# Patient Record
Sex: MALE | Race: WHITE | NOT HISPANIC OR LATINO | URBAN - METROPOLITAN AREA
[De-identification: names, ages, dates, MRNs, and addresses within clinical notes are randomized per-mention and may not be internally consistent; named-entity substitution may affect disease eponyms.]

---

## 2024-07-15 ENCOUNTER — OFFICE VISIT (OUTPATIENT)
Dept: PRIMARY CARE | Facility: CLINIC | Age: 24
End: 2024-07-15
Payer: COMMERCIAL

## 2024-07-15 ENCOUNTER — HOSPITAL ENCOUNTER (OUTPATIENT)
Dept: RADIOLOGY | Facility: HOSPITAL | Age: 24
Discharge: HOME | End: 2024-07-15
Payer: COMMERCIAL

## 2024-07-15 VITALS
WEIGHT: 137 LBS | SYSTOLIC BLOOD PRESSURE: 122 MMHG | BODY MASS INDEX: 20.76 KG/M2 | HEIGHT: 68 IN | DIASTOLIC BLOOD PRESSURE: 76 MMHG | TEMPERATURE: 96.7 F | OXYGEN SATURATION: 98 % | HEART RATE: 70 BPM

## 2024-07-15 DIAGNOSIS — M54.31 RIGHT SCIATIC NERVE PAIN: ICD-10-CM

## 2024-07-15 DIAGNOSIS — M54.31 RIGHT SCIATIC NERVE PAIN: Primary | ICD-10-CM

## 2024-07-15 DIAGNOSIS — F20.0 PARANOID SCHIZOPHRENIA (MULTI): ICD-10-CM

## 2024-07-15 DIAGNOSIS — J45.20 MILD INTERMITTENT ASTHMA WITHOUT COMPLICATION (HHS-HCC): ICD-10-CM

## 2024-07-15 PROCEDURE — 1036F TOBACCO NON-USER: CPT | Performed by: FAMILY MEDICINE

## 2024-07-15 PROCEDURE — 72110 X-RAY EXAM L-2 SPINE 4/>VWS: CPT | Performed by: RADIOLOGY

## 2024-07-15 PROCEDURE — 72110 X-RAY EXAM L-2 SPINE 4/>VWS: CPT

## 2024-07-15 PROCEDURE — 99204 OFFICE O/P NEW MOD 45 MIN: CPT | Performed by: FAMILY MEDICINE

## 2024-07-15 RX ORDER — IBUPROFEN 200 MG
200 TABLET ORAL EVERY 6 HOURS
COMMUNITY
End: 2024-07-15 | Stop reason: SDUPTHER

## 2024-07-15 RX ORDER — CYCLOBENZAPRINE HCL 5 MG
5 TABLET ORAL NIGHTLY PRN
Qty: 30 TABLET | Refills: 0 | Status: SHIPPED | OUTPATIENT
Start: 2024-07-15

## 2024-07-15 RX ORDER — IBUPROFEN 600 MG/1
600 TABLET ORAL EVERY 8 HOURS PRN
Qty: 90 TABLET | Refills: 0 | Status: SHIPPED | OUTPATIENT
Start: 2024-07-15

## 2024-07-15 ASSESSMENT — PATIENT HEALTH QUESTIONNAIRE - PHQ9
1. LITTLE INTEREST OR PLEASURE IN DOING THINGS: NOT AT ALL
2. FEELING DOWN, DEPRESSED OR HOPELESS: NOT AT ALL
SUM OF ALL RESPONSES TO PHQ9 QUESTIONS 1 AND 2: 0

## 2024-07-15 ASSESSMENT — LIFESTYLE VARIABLES: HOW MANY STANDARD DRINKS CONTAINING ALCOHOL DO YOU HAVE ON A TYPICAL DAY: PATIENT DOES NOT DRINK

## 2024-07-15 ASSESSMENT — PAIN SCALES - GENERAL: PAINLEVEL: 0-NO PAIN

## 2024-07-15 NOTE — PROGRESS NOTES
History Of Present Illness  Dante Crawford is a 23 y.o. male presenting for Establish Care (NP/ Est care- Concerns with vernon taylor in legs over the last 6years with at times legs just gives out patient states.)  .    HPI   New patient here to establish care.  He complains of intermittent right buttock pain that shoots down right leg. Last for a few days and then goes away on its own.  He takes ibuprofen 200 mg once every 4-6 hours without much relief.  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  He otherwise does not do exercise on its own except dance dance revolution once a week for 2 hours.  Currently he is not in any pain.  He denies any associated symptoms of saddle paresthesia, lower extremity weakness, bowel or bladder dysfunction.    History of paranoid schizophrenia. He's not currently established with Psych. Reports mood is stable. Lives with mom and stepdad.    Past Medical History  Patient Active Problem List    Diagnosis Date Noted    Paranoid schizophrenia (Multi) 07/15/2024    Allergic rhinitis due to animal (cat) (dog) hair and dander 04/11/2007    Asthma ([facility]) 04/11/2007        Medications  Current Outpatient Medications   Medication Sig Dispense Refill    cyclobenzaprine (Flexeril) 5 mg tablet Take 1 tablet (5 mg) by mouth as needed at bedtime for muscle spasms. 30 tablet 0    ibuprofen 600 mg tablet Take 1 tablet (600 mg) by mouth every 8 hours if needed for moderate pain (4 - 6). 90 tablet 0     No current facility-administered medications for this visit.        Surgical History  He has a past surgical history that includes Chest surgery.     Social History  He reports that he has never smoked. He has never used smokeless tobacco. He reports that he does not drink alcohol and does not use drugs.    Family History  Family History   Problem Relation Name Age of Onset    No Known Problems Mother      No Known Problems Sister          Allergies  Sulfa (sulfonamide antibiotics), Tree  "nuts, and Amoxicillin    Immunizations  Immunization History   Administered Date(s) Administered    Pfizer Purple Cap SARS-CoV-2 06/20/2021, 07/11/2021        ROS  Negative, except as discussed in HPI     Vitals  /76   Pulse 70   Temp 35.9 °C (96.7 °F)   Ht 1.727 m (5' 8\")   Wt 62.1 kg (137 lb)   SpO2 98%   BMI 20.83 kg/m²      Physical Exam  Vitals and nursing note reviewed.   Constitutional:       Appearance: Normal appearance.   Musculoskeletal:         General: Normal range of motion.      Cervical back: Normal. No spasms or bony tenderness.      Thoracic back: Normal. No spasms, tenderness or bony tenderness.      Lumbar back: Normal. No spasms, tenderness or bony tenderness. Negative right straight leg raise test and negative left straight leg raise test.   Neurological:      General: No focal deficit present.      Mental Status: He is alert.      Gait: Gait normal.      Deep Tendon Reflexes: Reflexes normal.         Relevant Results  Lab Results   Component Value Date    WBC 11.7 (H) 10/13/2018    WBC 14.6 (H) 10/08/2018    HGB 16.1 10/13/2018    HGB 14.3 10/08/2018    HCT 46.4 10/13/2018    HCT 42.5 10/08/2018    MCV 84.1 10/13/2018    MCV 85.7 10/08/2018     10/13/2018     10/08/2018     Lab Results   Component Value Date     10/13/2018     10/08/2018    K 4.1 10/13/2018    K 4.0 10/08/2018    CL 93 (L) 10/13/2018     10/08/2018    CO2 24 10/13/2018    CO2 23 (L) 10/08/2018    BUN 11 10/13/2018    BUN 9 10/08/2018    CREATININE 0.8 10/13/2018    CREATININE 1.0 10/08/2018    CALCIUM 9.6 10/13/2018    CALCIUM 8.5 10/08/2018    PROT 7.9 10/13/2018    PROT 6.5 10/08/2018    BILITOT 1.8 (H) 10/13/2018    BILITOT 1.5 (H) 10/08/2018    ALKPHOS 101 10/13/2018    ALKPHOS 85 10/08/2018    ALT 56 (H) 10/13/2018    ALT 21 10/08/2018    AST 68 (H) 10/13/2018    AST 68 (H) 10/08/2018    GLUCOSE 104 (H) 10/13/2018    GLUCOSE 94 10/08/2018     No results found for: \"HGBA1C\"  Lab " "Results   Component Value Date    TSH 1.36 10/13/2018    FREET4 1.5 10/13/2018      No results found for: \"CHOL\", \"TRIG\", \"HDL\", \"LDLDIRECT\"        Assessment/Plan   Dante was seen today for establish care.  Diagnoses and all orders for this visit:  Right sciatic nerve pain (Primary)  Comments:  conservative mgmt  Orders:  -     XR lumbar spine 2-3 views; Future  -     ibuprofen 600 mg tablet; Take 1 tablet (600 mg) by mouth every 8 hours if needed for moderate pain (4 - 6).  -     cyclobenzaprine (Flexeril) 5 mg tablet; Take 1 tablet (5 mg) by mouth as needed at bedtime for muscle spasms.  -     Referral to Physical Therapy; Future  -     Referral to Orthopaedic Surgery; Future  Paranoid schizophrenia (Multi)  Comments:  He says he is stable without meds or psych  Mild intermittent asthma without complication ([facility])  Comments:  Patient says he outgrew this, no inhalers         Counseling:   Medication education:   -Education:  The patient is counseled regarding potential side-effects of any and all new medications  -Understanding:  Patient expressed understanding of information discussed today  -Adherence:  No barriers to adherence identified    Final treatment plan is a result of shared decision making with patient.         Juventino White MD   "

## 2024-07-15 NOTE — PATIENT INSTRUCTIONS
-Go to radiology and walk in for Xray of low back  -Call to schedule physical therapy  -Take ibuprofen as needed for pain; when severe spasms also take cyclobenzaprine (can make you sleepy)

## 2024-07-22 ENCOUNTER — EVALUATION (OUTPATIENT)
Dept: PHYSICAL THERAPY | Facility: CLINIC | Age: 24
End: 2024-07-22
Payer: COMMERCIAL

## 2024-07-22 DIAGNOSIS — M54.16 LUMBAR RADICULOPATHY: Primary | ICD-10-CM

## 2024-07-22 DIAGNOSIS — M54.31 RIGHT SCIATIC NERVE PAIN: ICD-10-CM

## 2024-07-22 PROCEDURE — 97110 THERAPEUTIC EXERCISES: CPT | Mod: GP | Performed by: PHYSICAL THERAPIST

## 2024-07-22 PROCEDURE — 97161 PT EVAL LOW COMPLEX 20 MIN: CPT | Mod: GP | Performed by: PHYSICAL THERAPIST

## 2024-07-22 ASSESSMENT — ENCOUNTER SYMPTOMS
LOSS OF SENSATION IN FEET: 0
OCCASIONAL FEELINGS OF UNSTEADINESS: 0
DEPRESSION: 0

## 2024-07-22 NOTE — PROGRESS NOTES
Physical Therapy    Physical Therapy Evaluation and Treatment    Patient Name: Dante Crawford  MRN: [id number]  Today's Date: 7/22/2024  ANTH GGM - NO AUTH / [place] VISITS / DEDUCT $1000 - $36 met / 80% COVERAGE / OOP $3000 - $61 met / AVAILITY [id number] / Miah Anna GOMEZ, ref: [id number] / ds 7/19/24.        Current Problem:   1. Right sciatic nerve pain  Referral to Physical Therapy    conservative mgmt          Relevant Past Medical History: per  EMR  Paranoid schizophrenia (stable)  Asthma   Surgical History: none recent , pectus excavatum >10 yrs  Medications: per EMR-reviewed,    Allergies: :tree nuts,amoxicillin, sulfa    Precautions: none   STEADI Fall Risk: 0 (score of 4+ indicates fall risk)       SUBJECTIVE:   Pt is a 24 yo male c/o RT or LT intermittent buttock pain/shooting down leg usually just past knee. Pt has difficulty walking a few days and then sx improve.    Onset of sx:past 6 yrs  To see ortho David Fisher for further evaluation tomorrow   ZOFIA: unclear  Numbness  hips bilateral intermittent  Feels his legs can get weak, but not foot drop   Denies: loss bowel/bladder       Imaging:   IMPRESSION: XR 7/15/24  1. 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  Hobbies: dance revolution   Home Living: parents, multistory painful at times, one step at time   Prior Level of Function: prior to 6 years ago no limitations     Patient/Family Goal: decrease pain     Outcome Measures: ANA-40    OBJECTIVE:    Cognition: pt with good recall of hx   Posture: flexed, no lateral shift    Gait: no foot drop, normal arm swing   Functional Mobility: sit to stand no UE   Palpation: L paraspinals, piriformis negative   Joint Mobility:      LUMBAR AROM:  Flexion -full, no sx   Extension-full no sx   Rotation RT, LT full no sx   Lateral flexion RT,Ltn full no sx     SLR negative     HIP ROM full all planes no c/o pain   Scour negative   LE strength: out 5 scale MMT  Psoas and Hams 4+/5 bilateral all others 5/5 in LE    Hamstring flexibility -45 deg at 90     Treatments:  Therapeutic Exercise: (15 minutes)   SKTC x3 10 sec   DKTC x3 10 sec   90/90 hamstring stretch x3 10 sec   Prone lying no sx 60 sec   JUNIE no sx x1    Manual Therapy: ( minutes)    Gait Training: ( minutes)    Neuromuscular Re-education: ( minutes)    Therapeutic Activity: (5 minutes)  Supine to sit transfers, posture education, sleeping postures and use of pillows   Reviewed rx plan and goals    OP Education: see     HEP: perform all as tolerated   Access Code: [email or web address]  URL: [email or web address]  Date: 07/22/2024  Prepared by: Radha Nassar    Exercises  - Hooklying Single Knee to Chest Stretch  - 2 x daily - 7 x weekly - 3 reps - 10 hold  - Supine Double Knee to Chest Modified  - 2 x daily - 7 x weekly - 3 reps - 10 hold  - Supine Hamstring Stretch  - 2 x daily - 7 x weekly - 3 reps - 10 hold  - Prone Press Up On Elbows  - 7 x weekly - 10 reps - 6 hold    Patient Education  - Sleep Positions  - Log Roll  How to use a lumbar roll     Response to treatment: no change and no pain with werner     ASSESSMENT:   Pt is a 22 yo male c/o chronic past 6 yrs-RT or LT intermittent buttock pain/shooting down leg usually just past knee. Pt has difficulty walking a few days and then sx improve. N/T in hips.  Pt with tight hips and hamstrings.  Pt with very mild LE weakness.  Pt has poor posture with a forward head and flexed spine. Pt has poor body mechanics with transfers and pain with work related activities such as lifting and bending at waist.  Pt had good lumbar arom in eval and no pain with testing and a negative SLR sign. Co morbidities that may effect rx:Paranoid schizophrenia  (stable)  Asthma asked pt to bring inhaler to his appt  Surgical History: none recent , pectus excavatum >10 yrs  Pt could benefit from PT to improve the above impairments and increase function     PLAN:  Core strengthening, review lifting techniques, body mechanics   See how ortho visit went and if any updated testing     OP PT PLAN:  Treatment/Interventions: Education/Instruction , Manual Therapy  , Self care/home management , Therapeutic activities , and Therapeutic exercise    PT Plan: Skilled PT   PT Frequency: 1-2 times per week, per pt availability with job  Duration:8 weeks   Certification Period Start Date:   Certification Period End Date:   Visits Approved: ANTH GGM - NO AUTH / [place] VISITS / DEDUCT $1000 - $36 met / 80% COVERAGE / OOP $3000 - $61 met / AVAILITY [id number] / Miah GOMEZ, ref: [id number] / ds 24.   Rehab Potential: Good chronic sx   Plan of Care Agreement: Patient         Goals:   ST  Pt  will be I with HEP  Pt will be able to don socks and shoes with functional bending without pain.  Pt will report 25% reduction in pain  LT  Pt will improve ANA scores from 40% to 20%indicating improved function   Pt will improve hamstring flexibility 10 deg  Pt will demo safe body mechanics with bed transfers  Pt will demo hip hinge technique for lifting

## 2024-07-23 ENCOUNTER — APPOINTMENT (OUTPATIENT)
Dept: ORTHOPEDIC SURGERY | Facility: CLINIC | Age: 24
End: 2024-07-23
Payer: COMMERCIAL

## 2024-07-23 DIAGNOSIS — M54.16 LUMBAR RADICULOPATHY: Primary | ICD-10-CM

## 2024-07-23 DIAGNOSIS — M54.31 RIGHT SCIATIC NERVE PAIN: ICD-10-CM

## 2024-07-23 PROCEDURE — 99203 OFFICE O/P NEW LOW 30 MIN: CPT

## 2024-07-26 ENCOUNTER — HOSPITAL ENCOUNTER (EMERGENCY)
Facility: HOSPITAL | Age: 24
Discharge: HOME | End: 2024-07-26
Payer: COMMERCIAL

## 2024-07-26 ENCOUNTER — APPOINTMENT (OUTPATIENT)
Dept: RADIOLOGY | Facility: HOSPITAL | Age: 24
End: 2024-07-26
Payer: COMMERCIAL

## 2024-07-26 VITALS
BODY MASS INDEX: 21.07 KG/M2 | WEIGHT: 139 LBS | TEMPERATURE: 97.7 F | HEIGHT: 68 IN | SYSTOLIC BLOOD PRESSURE: 146 MMHG | HEART RATE: 70 BPM | OXYGEN SATURATION: 99 % | DIASTOLIC BLOOD PRESSURE: 90 MMHG | RESPIRATION RATE: 16 BRPM

## 2024-07-26 DIAGNOSIS — M54.16 LUMBAR RADICULOPATHY, ACUTE: ICD-10-CM

## 2024-07-26 DIAGNOSIS — S39.012A LUMBAR STRAIN, INITIAL ENCOUNTER: Primary | ICD-10-CM

## 2024-07-26 PROCEDURE — 99284 EMERGENCY DEPT VISIT MOD MDM: CPT | Mod: 25

## 2024-07-26 PROCEDURE — 72131 CT LUMBAR SPINE W/O DYE: CPT | Mod: FOREIGN READ | Performed by: RADIOLOGY

## 2024-07-26 PROCEDURE — 72131 CT LUMBAR SPINE W/O DYE: CPT

## 2024-07-26 RX ORDER — ACETAMINOPHEN 500 MG
1000 TABLET ORAL EVERY 8 HOURS PRN
Qty: 42 TABLET | Refills: 0 | Status: SHIPPED | OUTPATIENT
Start: 2024-07-26 | End: 2024-08-02

## 2024-07-26 RX ORDER — LIDOCAINE 560 MG/1
1 PATCH PERCUTANEOUS; TOPICAL; TRANSDERMAL DAILY
Qty: 5 PATCH | Refills: 0 | Status: SHIPPED | OUTPATIENT
Start: 2024-07-26 | End: 2024-07-31

## 2024-07-26 RX ORDER — IBUPROFEN 600 MG/1
600 TABLET ORAL EVERY 6 HOURS PRN
Qty: 20 TABLET | Refills: 0 | Status: SHIPPED | OUTPATIENT
Start: 2024-07-26 | End: 2024-07-31

## 2024-07-26 RX ORDER — CYCLOBENZAPRINE HCL 5 MG
5 TABLET ORAL 3 TIMES DAILY PRN
Qty: 9 TABLET | Refills: 0 | Status: SHIPPED | OUTPATIENT
Start: 2024-07-26 | End: 2024-07-29

## 2024-07-26 ASSESSMENT — COLUMBIA-SUICIDE SEVERITY RATING SCALE - C-SSRS
1. IN THE PAST MONTH, HAVE YOU WISHED YOU WERE DEAD OR WISHED YOU COULD GO TO SLEEP AND NOT WAKE UP?: NO
6. HAVE YOU EVER DONE ANYTHING, STARTED TO DO ANYTHING, OR PREPARED TO DO ANYTHING TO END YOUR LIFE?: NO
2. HAVE YOU ACTUALLY HAD ANY THOUGHTS OF KILLING YOURSELF?: NO

## 2024-07-26 ASSESSMENT — PAIN SCALES - GENERAL: PAINLEVEL_OUTOF10: 6

## 2024-07-26 ASSESSMENT — PAIN - FUNCTIONAL ASSESSMENT: PAIN_FUNCTIONAL_ASSESSMENT: 0-10

## 2024-07-26 NOTE — ED PROVIDER NOTES
HPI     CC: Back Injury     HPI: Dante Crawford is a 23 y.o. male with past medical history of chronic back pain not seen by a doctor presents with concern for new back injury.  He presents with his boss from work.  States he was lifting a 30 pound box when he felt a pop in his back and immediate shooting pain down his left leg.  He states he has had some chronic back pain over the last 6 years and has been trying to see a spine specialist without an available appointment.  He has never had surgery on his back, injections, or any formal evaluation.  He reports no saddle anesthesias, loss of bowel or bladder function, urinary symptoms, or direct trauma to the back.  The shooting pains stops just below the left knee and is circumferential or all throughout the leg in nature.  No acute sensory changes otherwise.  Ambulation has been normal.  Declines any pain medication at this time and did not take any prior to arrival.    ROS: 10-point review of systems was performed and is otherwise negative except as noted in HPI.      Past Medical History: Noncontributory except per HPI     Past Surgical History: Noncontributory except per HPI     Family History: Reviewed and noncontributory     Social History:  Noncontributory except per HPI       Allergies   Allergen Reactions    Sulfa (Sulfonamide Antibiotics) Hives and Rash    Tree Nuts Swelling    Amoxicillin Unknown       Past Medical History:   Diagnosis Date    Allergic        Home Meds:   Current Outpatient Medications   Medication Instructions    acetaminophen (TYLENOL) 1,000 mg, oral, Every 8 hours PRN    cyclobenzaprine (FLEXERIL) 5 mg, oral, 3 times daily PRN    ibuprofen 600 mg, oral, Every 6 hours PRN    lidocaine (AsperFlex, lidocaine,) 4 % patch 1 patch, transdermal, Daily, Remove & discard patch within 12 hours or as directed by MD.        ED Triage Vitals [07/26/24 1425]   Temperature Heart Rate Respirations BP   36.5 °C (97.7 °F) 70 16 146/90      Pulse Ox Temp  "src Heart Rate Source Patient Position   99 % -- -- --      BP Location FiO2 (%)     -- --         Heart Rate:  [70]   Temperature:  [36.5 °C (97.7 °F)]   Respirations:  [16]   BP: (146)/(90)   Height:  [172.7 cm (5' 8\")]   Weight:  [63 kg (139 lb)]   Pulse Ox:  [99 %]      Physical Exam:  Physical Exam  Constitutional:       General: He is not in acute distress.     Appearance: Normal appearance. He is not ill-appearing.   HENT:      Head: Normocephalic and atraumatic.      Right Ear: External ear normal.      Left Ear: External ear normal.      Nose: Nose normal.      Mouth/Throat:      Mouth: Mucous membranes are moist.   Eyes:      Extraocular Movements: Extraocular movements intact.      Conjunctiva/sclera: Conjunctivae normal.      Pupils: Pupils are equal, round, and reactive to light.   Cardiovascular:      Rate and Rhythm: Normal rate and regular rhythm.      Pulses: Normal pulses.   Pulmonary:      Effort: Pulmonary effort is normal. No respiratory distress.      Breath sounds: Normal breath sounds.   Abdominal:      General: Abdomen is flat.      Palpations: Abdomen is soft.      Tenderness: There is no abdominal tenderness.   Musculoskeletal:      Cervical back: Normal range of motion and neck supple.      Comments: Point tender over L1/L2 with extension laterally towards the left with radiating pain down the left leg that stops just below the knee.  Patient states that is all throughout the left leg but there are no acute sensory changes.  Sensation is symmetric bilaterally.  Negative straight leg raise bilaterally.  Ambulation is normal.  No saddle anesthesias.  Reflexes are normal.   Skin:     General: Skin is warm and dry.      Capillary Refill: Capillary refill takes less than 2 seconds.   Neurological:      General: No focal deficit present.      Mental Status: He is alert and oriented to person, place, and time.   Psychiatric:         Mood and Affect: Mood normal.          Diagnostic Results  "       Labs Reviewed - No data to display      CT lumbar spine wo IV contrast   Final Result   No fracture or malalignment.  No significant central canal or neural   foraminal stenosis.  MRI may be helpful for increased sensitivity.   Signed by Ladarius Chicas, DO                    No data recorded                Procedure  Procedures    ED Course & MDM   Assessment/Plan:     Medications - No data to display     Diagnoses as of 07/26/24 1717   Lumbar strain, initial encounter   Lumbar radiculopathy, acute       Medical Decision Making    Dante Crawford is a 23 y.o. male with past medical history of chronic back pain not seen by a doctor presents with concern for new back injury.  Patient is nontoxic-appearing and vital signs are normal.  Based on symptoms presentation, differential diagnosis includes lumbar strain, lumbar radiculopathy, herniated disc in the lumbar region.  Patient does not have any current red flag symptoms requiring urgent MRI or surgical consultation at this time.  Because he has bony midline tenderness new injury, will obtain CT lumbar spine without contrast.  Patient is declining medication at this time.  Lumbar CT was negative for acute traumatic findings.  Patient was reevaluated and has an unchanged exam at this time.  No further workup indicated.    Lumbar strain with radiculopathy: Patient was educated about his findings.  We discussed ongoing treatment plan, he then mentions that he did see Ortho spine a few weeks ago who recommended an MRI but stated it would be out-of-pocket.  He then asked me if his job would now pay for the MRI because he injured himself at work.  We discussed that he will likely still need to undergo physical therapy as previously prescribed and a trial of medications as he does not have any urgent symptoms requiring MRI today.  I did send him home with a trial of Flexeril, Tylenol, Motrin, and lidocaine patches and write him off of work for 1 week.  We discussed he  should return to see his spine provider or start care with a new provider within 1 to 2 weeks for repeat evaluation but should continue physical therapy.  If he does develop any red flag symptoms such as numbness or tingling persistent of bilateral extremities, loss of bowel or bladder function, saddle anesthesias, or new fevers or chills with back pain, he should return to the nearest emergency department.  Patient agreeable to plan of care and felt comfortable returning home.    Disposition: Home    ED Prescriptions       Medication Sig Dispense Start Date End Date Auth. Provider    cyclobenzaprine (Flexeril) 5 mg tablet Take 1 tablet (5 mg) by mouth 3 times a day as needed for muscle spasms for up to 3 days. 9 tablet 7/26/2024 7/29/2024 Ifeoma Trevino PA-C    lidocaine (AsperFlex, lidocaine,) 4 % patch Place 1 patch over 12 hours on the skin once daily for 5 days. Remove & discard patch within 12 hours or as directed by MD. 5 patch 7/26/2024 7/31/2024 Ifeoma Trevino PA-C    acetaminophen (Tylenol) 500 mg tablet Take 2 tablets (1,000 mg) by mouth every 8 hours if needed for mild pain (1 - 3) for up to 7 days. 42 tablet 7/26/2024 8/2/2024 Ifeoma Trevino PA-C    ibuprofen 600 mg tablet Take 1 tablet (600 mg) by mouth every 6 hours if needed for moderate pain (4 - 6) for up to 5 days. 20 tablet 7/26/2024 7/31/2024 Ifeoma Trevino PA-C            Social Determinants Affecting Care: none     Ifeoma Trevino PA-C    This note was dictated by speech recognition. Minor errors in transcription may be present.     Ifeoma Trevino PA-C  07/26/24 7697

## 2024-07-26 NOTE — Clinical Note
Dante Crawford was seen and treated in our emergency department on 7/26/2024.  He may return to work on 08/01/2024.       If you have any questions or concerns, please don't hesitate to call.      Ifeoma Trevino PA-C

## 2024-07-26 NOTE — ED TRIAGE NOTES
Pt to ED for back injury that happened at work. Pt bent over to  a 30 pound box, when he lifted it and felt a pop. Pt states it is painful and tender, that it feels like there is electricity shooting down his left leg. Pt is awake, alert, ambulatory, with stable vitals upon arrival.   Bilobed Transposition Flap Text: The defect edges were debeveled with a #15 scalpel blade.  Given the location of the defect and the proximity to free margins a bilobed transposition flap was deemed most appropriate.  Using a sterile surgical marker, an appropriate bilobe flap drawn around the defect.    The area thus outlined was incised deep to adipose tissue with a #15 scalpel blade.  The skin margins were undermined to an appropriate distance in all directions utilizing iris scissors.

## 2024-07-29 ENCOUNTER — TREATMENT (OUTPATIENT)
Dept: PHYSICAL THERAPY | Facility: CLINIC | Age: 24
End: 2024-07-29
Payer: COMMERCIAL

## 2024-07-29 DIAGNOSIS — M54.16 LUMBAR RADICULOPATHY: ICD-10-CM

## 2024-07-29 PROCEDURE — 97110 THERAPEUTIC EXERCISES: CPT | Mod: GP | Performed by: PHYSICAL THERAPIST

## 2024-07-29 NOTE — PROGRESS NOTES
"Physical Therapy    Physical Therapy Treatment    Patient Name: Dante Crawford  MRN: [id number]  Today's Date: 7/29/2024         Visit #: 2 out of 8/16  Insurance: ANTH GGM - NO AUTH / [place] VIS[place]   Evaluation date: 7/22/24      Current Problem:   1. Lumbar radiculopathy  Follow Up In Physical Therapy          SUBJECTIVE:   Not having pain today, pt states he went to ortho specialist needs a month of PT before he can get MRI  Friday he went to ED due to being at work, bent over to  a box 30#, felt a shift pop in his back (pt doesn't typically have back pain usually leg), CT scan was done and told couldn't find anything, needs to get MRI, has another appt with MD on Aug 1st to evaluate spine, possibly r/o L disc issue   Pt had pain x 2 days then sx improved  No bowel bladder loss, some tingling was in left leg gone now.   Pt tried his HEP Sunday and went ok   Precautions: disc pathology being ruled out, Co morbidities that may effect rx:Paranoid schizophrenia (stable)  Asthma asked pt to bring inhaler to his appt  Surgical History: none recent , pectus excavatum >10 yrs  CT lumbar spine wo IV contrast per ED   Final Result  \"No fracture or malalignment.  No significant central canal or neural  foraminal stenosis.  MRI may be helpful for increased sensitivity.\"  Signed by Ladarius Chicas DO               Pain:   Start of session: 0       OBJECTIVE:    Poor posture though no lateral shift    Treatments:  Therapeutic Exercise: (40 minutes)   recumbent bike L1 x 5 min  Slant board calf stretch   Hook:  [facility] 3 x 10 sec   90/90 hamstring stretch 3x 15 sec  Piriformis stretch cross body 3x 20 sec  TVA x 10 3 sec hold   TVA hip adduction 3 sec x10  TVA hip abd blue 2x10  Prone 60 sec   Prone Press up 10x 6sec     Manual Therapy: ( minutes)    Gait Training: ( minutes)    Neuromuscular Re-education: ( minutes)    Therapeutic Activity: (5 minutes)  Body mechanics review:Avoid bend lift twist, keep items close to his body if " lifting, golfers stance, how to lift from overhead    How to hip hinge  Reminder of supine to sit transfers      HEP:Access Code: [email or web address]  URL: [email or web address]  Date: 2024  Prepared by: Radha Nassar    Exercises  - Supine Piriformis Stretch with Foot on Ground  - 2 x daily - 7 x weekly - 3 reps - 20 hold  - Supine Hip Adduction Isometric with Ball  - 3 x weekly - 10 reps - 3 hold  - Hooklying Clamshell with Resistance  - 3 x weekly - 2-3 sets - 10 reps  Access Code: [email or web address]  URL: [email or web address]  Date: 2024  Prepared by: Radha Nassar     Exercises  - Hooklying Single Knee to Chest Stretch  - 2 x daily - 7 x weekly - 3 reps - 10 hold  - Supine Double Knee to Chest Modified  - 2 x daily - 7 x weekly - 3 reps - 10 hold  - Supine Hamstring Stretch  - 2 x daily - 7 x weekly - 3 reps - 10 hold  - Prone Press Up On Elbows  - 7 x weekly - 10 reps - 6 hold   end with extension     ASSESSMENT:   Pt did well with flexibility and start of core strengthening  and so far was not symptomatic with werner or entering dept    Post session pain: none      PLAN:  Advance DLS  OP PT PLAN:  Treatment/Interventions: Education/Instruction , Manual Therapy  , Neuromuscular re-education , Self care/home management , Therapeutic activities , and Therapeutic exercise    PT Plan: Skilled PT   PT Frequency: 2 times per week   Duration:8 weeks  16 sessions  Certification Period Start Date:   Certification Period End Date:   Visits Approved: [place]  Rehab Potential: Good  Plan of Care Agreement: Patient         Goals:   ST  Pt  will be I with HEP  Pt will be able to don socks and shoes with functional bending without pain.  Pt will report 25% reduction in pain  LT  Pt will improve ANA scores from 40% to 20%indicating improved function   Pt will improve hamstring flexibility 10 deg  Pt will demo safe body mechanics with bed transfers  Pt will demo hip hinge technique for lifting

## 2024-08-01 ENCOUNTER — APPOINTMENT (OUTPATIENT)
Dept: ORTHOPEDIC SURGERY | Facility: CLINIC | Age: 24
End: 2024-08-01
Payer: COMMERCIAL

## 2024-08-01 VITALS — WEIGHT: 138 LBS | BODY MASS INDEX: 20.92 KG/M2 | HEIGHT: 68 IN

## 2024-08-01 DIAGNOSIS — S39.012A LUMBAR STRAIN, INITIAL ENCOUNTER: ICD-10-CM

## 2024-08-01 DIAGNOSIS — M54.16 LUMBAR RADICULOPATHY, ACUTE: ICD-10-CM

## 2024-08-01 PROCEDURE — 99213 OFFICE O/P EST LOW 20 MIN: CPT | Performed by: PHYSICIAN ASSISTANT

## 2024-08-01 PROCEDURE — 3008F BODY MASS INDEX DOCD: CPT | Performed by: PHYSICIAN ASSISTANT

## 2024-08-01 ASSESSMENT — PAIN - FUNCTIONAL ASSESSMENT: PAIN_FUNCTIONAL_ASSESSMENT: 0-10

## 2024-08-01 ASSESSMENT — PAIN DESCRIPTION - DESCRIPTORS: DESCRIPTORS: NUMBNESS

## 2024-08-01 NOTE — PROGRESS NOTES
"HPI:  Dante Crawford is a 23 y.o. male who presents to the spine clinic for follow up of back and radicular pain. Previously saw my colleague David Fisher PA-C on 07/23/24.     LOV he was provided script for Gabapentin which he has been using PRN. Referral to PT placed with initial eval on 07/22/24 and has been ongoing.     He unfortunately had a severe flare up of pain prompting an ED visit on 07/26/24. He reports he was lifting something heavy at work prompting his back to give out and he was \"out of commission\" for a few days. It was recommended he follow up here today.     Today, he reports resolution of symptoms. He is feeling well without back or radicular pain. Denies numbness/tingling or leg weakness.     ROS:  Reviewed on EMR and patient intake sheet.    PMH/SH:  Reviewed on EMR and patient intake sheet.    Exam:  Physical Exam  Spine Musculoskeletal Exam    Well appearing, NAD  Pleasant & cooperative  BMI 20.98  normal ROM lumbar spine with rotation, flexion/extension  No paraspinal muscle spasms  lower extremity sensation intact to light touch  lower extremity motor 5/5 throughout  normal gait & station    Radiology:     CT lumbar spine dated 07/26/24 personally reviewed along with the accompanying rad report. No fractures or malalignment. Disc spaces well maintained.     Assessment and Plan:  1. Lumbar strain, initial encounter    2. Lumbar radiculopathy, acute  - Referral to Orthopaedic Surgery  - MR lumbar spine wo IV contrast; Future    Reviewed CT scan and reassured no acute findings. Recommend he continue with the PT and Gabapentin as tolerated.     Recommend caution with bending/lifting/twisting at the waist. He can ambulate as tolerated.     Referral for MRI lumbar spine provided today if his pain persists despite the above plan. He can follow up after MRI complete, or sooner if needed.    Patient in agreement to plan of care. Of course Dante Crawford is welcome to call at anytime with questions or " concerns.     Elisabet Perez PA-C  Department of Orthopaedic Surgery    [address] [place] [address]    Voicemail: [phone]   Appts: [phone]  Fax: [phone]

## 2024-08-05 ENCOUNTER — TREATMENT (OUTPATIENT)
Dept: PHYSICAL THERAPY | Facility: CLINIC | Age: 24
End: 2024-08-05
Payer: COMMERCIAL

## 2024-08-05 DIAGNOSIS — M54.16 LUMBAR RADICULOPATHY: ICD-10-CM

## 2024-08-05 PROCEDURE — 97110 THERAPEUTIC EXERCISES: CPT | Mod: GP | Performed by: PHYSICAL THERAPIST

## 2024-08-05 NOTE — PROGRESS NOTES
"Physical Therapy    Physical Therapy Treatment    Patient Name: Dante Crawford  MRN: [id number]  Today's Date: 8/5/2024    Time Calculation  Start Time: 1245  Stop Time: 1330  Time Calculation (min): 45 min    Visit #: 3 out of 8/16  Insurance: ANTH GGM - NO AUTH / [place]IS[place]   Evaluation date: 7/22/24      Current Problem:   1. Lumbar radiculopathy  Follow Up In Physical Therapy          SUBJECTIVE:   Not having pain today, pt states MRI scheduled for 8/19  Did well post last visit  Slight pain back with bending to lift at his work    Past:  Friday he went to ED due to being at work, bent over to  a box 30#, felt a shift pop in his back (pt doesn't typically have back pain usually leg), CT scan was done and told couldn't find anything, needs to get MRI, has another appt with MD on Aug 1st to evaluate spine, possibly r/o L disc issue   Pt had pain x 2 days then sx improved  No bowel bladder loss, some tingling was in left leg gone now.   Pt tried his HEP Sunday and went ok   Precautions: disc pathology being ruled out, Co morbidities that may effect rx:Paranoid schizophrenia (stable)  Asthma asked pt to bring inhaler to his appt  Surgical History: none recent , pectus excavatum >10 yrs  CT lumbar spine wo IV contrast per ED   Final Result  \"No fracture or malalignment.  No significant central canal or neural  foraminal stenosis.  MRI may be helpful for increased sensitivity.\"  Signed by Ladarius Chicas, DO          Pain:   Start of session: 0       OBJECTIVE:    Very tight hamstrings   Poor posture flexed thoracic sway back    Treatments:  Therapeutic Exercise: (44 minutes)   recumbent bike L1 x 5 min  Slant board calf stretch 20 sec x3  Standing:  - row DA 2x10 peach (green too much UT LT area)  -shoulder ext DA peach 2x10     Hook:  SKC 3 x 10 sec   90/90 hamstring stretch 3x 15 sec  Piriformis stretch cross body 3x 20 sec  TVA x 12 3 sec hold   TVA hip adduction 3 sec x15  TVA hip abd blue 3x10  Bridging x10 "   Prone 60 sec   Prone Press up 10x 6sec     Manual Therapy: ( minutes)    Gait Training: ( minutes)    Neuromuscular Re-education: ( minutes)    Therapeutic Activity: past   Body mechanics review:Avoid bend lift twist, keep items close to his body if lifting, golfers stance, how to lift from overhead    How to hip hinge  Reminder of supine to sit transfers      HEP:Access Code: [email or web address]  URL: [email or web address]  Date: 2024  Prepared by: Radha Nassar    Exercises  - Supine Piriformis Stretch with Foot on Ground  - 2 x daily - 7 x weekly - 3 reps - 20 hold  - Supine Hip Adduction Isometric with Ball  - 3 x weekly - 10 reps - 3 hold  - Hooklying Clamshell with Resistance  - 3 x weekly - 2-3 sets - 10 reps  Access Code: [email or web address]  URL: [email or web address]  Date: 2024  Prepared by: Radha Nassar     Exercises  - Hooklying Single Knee to Chest Stretch  - 2 x daily - 7 x weekly - 3 reps - 10 hold  - Supine Double Knee to Chest Modified  - 2 x daily - 7 x weekly - 3 reps - 10 hold  - Supine Hamstring Stretch  - 2 x daily - 7 x weekly - 3 reps - 10 hold  - Prone Press Up On Elbows  - 7 x weekly - 10 reps - 6 hold   end with extension     ASSESSMENT:   Pt did well with today's interventions to improve spinal strengthening.    Post session pain: none      PLAN:  Advance DLS, updated hep  OP PT PLAN:  Treatment/Interventions: Education/Instruction , Manual Therapy  , Neuromuscular re-education , Self care/home management , Therapeutic activities , and Therapeutic exercise    PT Plan: Skilled PT   PT Frequency: 2 times per week   Duration:8 weeks  16 sessions  Certification Period Start Date:   Certification Period End Date:   Visits Approved: [place]  Rehab Potential: Good  Plan of Care Agreement: Patient         Goals:   ST  Pt  will be I with HEP  Pt will be able to don socks and shoes with functional bending without pain.  Pt will report 25% reduction in pain  LT  Pt will improve ANA scores  from 40% to 20%indicating improved function   Pt will improve hamstring flexibility 10 deg  Pt will demo safe body mechanics with bed transfers  Pt will demo hip hinge technique for lifting

## 2024-08-12 ENCOUNTER — TREATMENT (OUTPATIENT)
Dept: PHYSICAL THERAPY | Facility: CLINIC | Age: 24
End: 2024-08-12
Payer: COMMERCIAL

## 2024-08-12 DIAGNOSIS — M54.16 LUMBAR RADICULOPATHY: ICD-10-CM

## 2024-08-12 PROCEDURE — 97110 THERAPEUTIC EXERCISES: CPT | Mod: GP

## 2024-08-12 NOTE — PROGRESS NOTES
"Physical Therapy    Physical Therapy Treatment    Patient Name: Dante Crawford  MRN: [id number]  Today's Date: 8/12/2024    Time Calculation  Start Time: 1233  Stop Time: 1311  Time Calculation (min): 38 min    Visit #: 4 out of 8/16  Insurance: ANTH GGM - NO AUTH / [place] VISIT   Evaluation date: 7/22/24      Current Problem:   1. Lumbar radiculopathy  Follow Up In Physical Therapy        MRI 8/19/24    SUBJECTIVE:   Patient reports that he is doing well today, denies pain. Last Monday was last episode with symptoms into L LE, symptoms improved Thursday. Patient reports that he did not have work Monday. Patient had last appointment last Monday and felt symptoms after but later that night had symptoms.     Precautions: disc pathology being ruled out, Co morbidities that may effect rx:Paranoid schizophrenia (stable)  Asthma asked pt to bring inhaler to his appt  Surgical History: none recent , pectus excavatum >10 yrs  CT lumbar spine wo IV contrast per ED   Final Result  \"No fracture or malalignment.  No significant central canal or neural  foraminal stenosis.  MRI may be helpful for increased sensitivity.\"  Signed by Ladarius Chicas DO        Pain:   Start of session: 0       OBJECTIVE:    Lumbar AROM:   FLEX: WFL, pain free   EXT: WFL, pain free   SB: WFL, pain free   ROT: WFL, pain free    Treatments:  Therapeutic Exercise: (38 minutes)  Recumbent bike L1 x 6 min  Prone lying x3 mins   Prone press OE 2x10   H/L TrA brace 2x15  H/L TrA brace + bridge 2x15   H/L figure 4 stretch 5x10 seconds   Standing hip ABD x15 ea. (DA support)  Standing hip EXT x15 ea. (DA support)    Manual Therapy: ( minutes)    Gait Training: ( minutes)    Neuromuscular Re-education: ( minutes)    Therapeutic Activity: ( minutes)     HEP:  Access Code: [email or web address]  URL: [email or web address]  Date: 07/29/2024  Prepared by: Radha Nassar    Exercises  - Supine Piriformis Stretch with Foot on Ground  - 2 x daily - 7 x weekly - 3 reps - 20 hold  - " Supine Hip Adduction Isometric with Ball  - 3 x weekly - 10 reps - 3 hold  - Hooklying Clamshell with Resistance  - 3 x weekly - 2-3 sets - 10 reps  Access Code: [email or web address]  URL: [email or web address]  Date: 2024  Prepared by: Radha Nassar     Exercises  - Hooklying Single Knee to Chest Stretch  - 2 x daily - 7 x weekly - 3 reps - 10 hold--HOLD   - Supine Double Knee to Chest Modified  - 2 x daily - 7 x weekly - 3 reps - 10 hold--HOLD   - Supine Hamstring Stretch  - 2 x daily - 7 x weekly - 3 reps - 10 hold  - Prone Press Up On Elbows  - 7 x weekly - 10 reps - 6 hold   end with extension     ASSESSMENT:   Patient encouraged to focus on extension based interventions to promote maintain centralization of symptoms. Patient education on importance of abdominal strengthening to support spine. Patient was able to maintain centralization of symptoms through treatment session. Patient remains pain free with lumbar AROM in all planes. Patient plans to follow up after MRI.     Post session pain: no increase      PLAN:    OP PT PLAN:  Treatment/Interventions: Education/Instruction , Manual Therapy  , Neuromuscular re-education , Self care/home management , Therapeutic activities , and Therapeutic exercise    PT Plan: Skilled PT   PT Frequency: 2 times per week   Duration:8 weeks  16 sessions  Visits Approved: [place]  Rehab Potential: Good  Plan of Care Agreement: Patient         Goals:   ST  Pt  will be I with HEP-PROGRESSING   Pt will be able to don socks and shoes with functional bending without pain.-PROGRESSING   Pt will report 25% reduction in pain-PROGRESSING   LT  Pt will improve ANA scores from 40% to 20%indicating improved function -PROGRESSING   Pt will improve hamstring flexibility 10 deg-PROGRESSING   Pt will demo safe body mechanics with bed transfers-PROGRESSING   Pt will demo hip hinge technique for lifting -PROGRESSING

## 2024-08-19 ENCOUNTER — APPOINTMENT (OUTPATIENT)
Dept: RADIOLOGY | Facility: HOSPITAL | Age: 24
End: 2024-08-19
Payer: COMMERCIAL

## 2024-08-19 ENCOUNTER — DOCUMENTATION (OUTPATIENT)
Dept: ORTHOPEDIC SURGERY | Facility: CLINIC | Age: 24
End: 2024-08-19
Payer: COMMERCIAL

## 2024-08-19 NOTE — PROGRESS NOTES
Patient MRI denied. P2P completed Wednesday 08/14/24 - per P2P physician patient needs to complete 6 weeks of PT and then follow up in office to confirm no improvement of symptoms. MRI can be resubmitted after FUV.

## 2024-08-31 ENCOUNTER — APPOINTMENT (OUTPATIENT)
Dept: RADIOLOGY | Facility: HOSPITAL | Age: 24
End: 2024-08-31
Payer: COMMERCIAL

## 2024-09-03 ENCOUNTER — TREATMENT (OUTPATIENT)
Dept: PHYSICAL THERAPY | Facility: CLINIC | Age: 24
End: 2024-09-03
Payer: COMMERCIAL

## 2024-09-03 DIAGNOSIS — M54.16 LUMBAR RADICULOPATHY: ICD-10-CM

## 2024-09-03 PROCEDURE — 97110 THERAPEUTIC EXERCISES: CPT | Mod: GP | Performed by: PHYSICAL THERAPIST

## 2024-09-03 NOTE — PROGRESS NOTES
"Physical Therapy    Physical Therapy Treatment    Patient Name: Dante Crawford  MRN: [id number]  Today's Date: 9/3/2024    Time Calculation  Start Time: 1430  Stop Time: 1511  Time Calculation (min): 41 min    Visit #: 5 out of 8/16  Insurance: ANTH GGM - NO AUTH / [place] VISIT   Evaluation date: 7/22/24      Current Problem:   1. Lumbar radiculopathy  Follow Up In Physical Therapy        MRI 8/19/24    SUBJECTIVE:   Pt states he has to do 3 more weeks of PT before he can get a MRI  Pt feels his left is tingling to just below his knee,not feeling back pain, foot is not slapping or dragging.   Sx occurred last Wednesday  and a little better today  Not sure what trigger it , woke up with it numb and got worse at work  X body piriformis stretch can bother him when he is flared other wise ok    Precautions: disc pathology being ruled out, Co morbidities that may effect rx:Paranoid schizophrenia (stable)  Asthma asked pt to bring inhaler to his appt  Surgical History: none recent , pectus excavatum >10 yrs  CT lumbar spine wo IV contrast per ED   Final Result  \"No fracture or malalignment.  No significant central canal or neural  foraminal stenosis.  MRI may be helpful for increased sensitivity.\"  Signed by Ladarius Chicas,         Pain:   Start of session: off and on 0-4/10       OBJECTIVE:    Lumbar AROM:   FLEX: 25% loss lt buttock to knee   EXT: WFL, pain free   SB: WFL, pain free to RT, pain to loft    ROT: WFL, pain free  Trial of LS distraction does not change tingling   Treatments:  Therapeutic Exercise: (41 minutes)  Recumbent bike L2 x 6 min  Prone lying x3 mins   Prone press OE 2x10   Prone gluteal sets x10/3 sec   H/L TrA brace 2x15  H/L TrA brace + bridge 2x15   H/L figure 4 stretch 5x10 seconds , had stop left side due to pain   Standing hip ABD x10 ea. (DA support)  Standing hip EXT x5 ea pain started to increase . (DA support)  Trial of sideling hips to RT increased pain increased pain ant thigh    Manual " Therapy: ( minutes)    Gait Training: ( minutes)    Neuromuscular Re-education: ( minutes)    Therapeutic Activity: ( minutes)     HEP:  Access Code: [email or web address]  URL: [email or web address]  Date: 2024  Prepared by: Radha Nassar    Exercises  - Supine Piriformis Stretch with Foot on Ground  - 2 x daily - 7 x weekly - 3 reps - 20 hold  - Supine Hip Adduction Isometric with Ball  - 3 x weekly - 10 reps - 3 hold  - Hooklying Clamshell with Resistance  - 3 x weekly - 2-3 sets - 10 reps  Access Code: [email or web address]  URL: [email or web address]  Date: 2024  Prepared by: Radha Maday     Exercises  - Hooklying Single Knee to Chest Stretch  - 2 x daily - 7 x weekly - 3 reps - 10 hold--HOLD   - Supine Double Knee to Chest Modified  - 2 x daily - 7 x weekly - 3 reps - 10 hold--HOLD   - Supine Hamstring Stretch  - 2 x daily - 7 x weekly - 3 reps - 10 hold  - Prone Press Up On Elbows  - 7 x weekly - 10 reps - 6 hold   end with extension     ASSESSMENT:   Patient to do extension as tolerated as in dept went well, side glides increased sx  Tingling same, pain better   L/S distraction no change in tingling   Pain with lumbar flexion in standing today    Post session pain: see assessment     PLAN:    OP PT PLAN:  Treatment/Interventions: Education/Instruction , Manual Therapy  , Neuromuscular re-education , Self care/home management , Therapeutic activities , and Therapeutic exercise    PT Plan: Skilled PT   PT Frequency: 2 times per week   Duration:8 weeks  16 sessions  Visits Approved: [place]  Rehab Potential: Good  Plan of Care Agreement: Patient         Goals:   ST  Pt  will be I with HEP-PROGRESSING   Pt will be able to don socks and shoes with functional bending without pain.-PROGRESSING   Pt will report 25% reduction in pain-PROGRESSING   LT  Pt will improve ANA scores from 40% to 20%indicating improved function -PROGRESSING   Pt will improve hamstring flexibility 10 deg-PROGRESSING   Pt will demo safe  body mechanics with bed transfers-PROGRESSING   Pt will demo hip hinge technique for lifting -PROGRESSING

## 2024-09-10 ENCOUNTER — TREATMENT (OUTPATIENT)
Dept: PHYSICAL THERAPY | Facility: CLINIC | Age: 24
End: 2024-09-10
Payer: COMMERCIAL

## 2024-09-10 DIAGNOSIS — M54.16 LUMBAR RADICULOPATHY: ICD-10-CM

## 2024-09-10 PROCEDURE — 97542 WHEELCHAIR MNGMENT TRAINING: CPT | Mod: GP,CQ

## 2024-09-10 NOTE — PROGRESS NOTES
"Physical Therapy    Physical Therapy Treatment    Patient Name: Dante Crawford  MRN: [id number]  Today's Date: 9/10/2024    Time Calculation  Start Time: 1530  Stop Time: 1610  Time Calculation (min): 40 min    Visit #: 6  out of 8/16  Insurance: ANTH GGM - NO AUTH / [place] VISIT   Evaluation date: 7/22/24      Current Problem:   1. Lumbar radiculopathy  Follow Up In Physical Therapy        MRI 8/19/24    SUBJECTIVE:   Last week tingling in lower leg plus sharp pain in lower leg/foot. Last 5 days or he has not had these sx's, fluctuates.  Compliant with HEP      Precautions: disc pathology being ruled out, Co morbidities that may effect rx:Paranoid schizophrenia (stable)  Asthma asked pt to bring inhaler to his appt  Surgical History: none recent , pectus excavatum >10 yrs  CT lumbar spine wo IV contrast per ED   Final Result  \"No fracture or malalignment.  No significant central canal or neural  foraminal stenosis.  MRI may be helpful for increased sensitivity.\"  Signed by Ladarius Chicas,         Pain:   Start of session: off and on 0/10       OBJECTIVE:    previous  Lumbar AROM:   FLEX: 25% loss lt buttock to knee   EXT: WFL, pain free   SB: WFL, pain free to RT, pain to loft    ROT: WFL, pain free  Trial of LS distraction does not change tingling   Treatments:  Therapeutic Exercise: (40 minutes)  Recumbent bike L3 x 5 min  Standing hip ABD x10 ea. (DA support on physioball)  Standing hip EXT x5 ea pain started to increase . (DA support on physioball)  H/L TrA brace + bridge 2x15  Supine hamstring stretch L/R 20 sec x 2 each (mod tight left, min tight right)  added to HEP  Prone lying x3 mins   Prone press OE 2x10   Prone gluteal sets x10/3 sec   Standing gastroc stretch at wall 20 sec x 2 L/R (added to HEP)  Instructed in standing hip hinge with dowel practice        Manual Therapy: ( minutes)    Gait Training: ( minutes)    Neuromuscular Re-education: ( minutes)    Therapeutic Activity: ( minutes)     HEP:  Access " Code: [email or web address]  URL: [email or web address]  Date: 2024  Prepared by: Radha Nassar    Exercises  - Supine Piriformis Stretch with Foot on Ground  - 2 x daily - 7 x weekly - 3 reps - 20 hold  - Supine Hip Adduction Isometric with Ball  - 3 x weekly - 10 reps - 3 hold  - Hooklying Clamshell with Resistance  - 3 x weekly - 2-3 sets - 10 reps  Access Code: [email or web address]  URL: [email or web address]  Date: 2024  Prepared by: Radha Nassar     Exercises  - Hooklying Single Knee to Chest Stretch  - 2 x daily - 7 x weekly - 3 reps - 10 hold--HOLD   - Supine Double Knee to Chest Modified  - 2 x daily - 7 x weekly - 3 reps - 10 hold--HOLD   - Supine Hamstring Stretch  - 2 x daily - 7 x weekly - 3 reps - 10 hold  - Prone Press Up On Elbows  - 7 x weekly - 10 reps - 6 hold   end with extension     ASSESSMENT:   Extension based exercises with neutral spine core strengthening, proximal hip strengthening to improve spinal support and maintain centralization or sx's    Post session pain:   denies     PLAN:    OP PT PLAN:  Treatment/Interventions: Education/Instruction , Manual Therapy  , Neuromuscular re-education , Self care/home management , Therapeutic activities , and Therapeutic exercise    PT Plan: Skilled PT   PT Frequency: 2 times per week   Duration:8 weeks  16 sessions  Visits Approved: [place]  Rehab Potential: Good  Plan of Care Agreement: Patient         Goals:   ST  Pt  will be I with HEP-PROGRESSING   Pt will be able to don socks and shoes with functional bending without pain.-PROGRESSING   Pt will report 25% reduction in pain-PROGRESSING   LT  Pt will improve ANA scores from 40% to 20%indicating improved function -PROGRESSING   Pt will improve hamstring flexibility 10 deg-PROGRESSING   Pt will demo safe body mechanics with bed transfers-PROGRESSING   Pt will demo hip hinge technique for lifting -PROGRESSING

## 2024-09-17 ENCOUNTER — TREATMENT (OUTPATIENT)
Dept: PHYSICAL THERAPY | Facility: CLINIC | Age: 24
End: 2024-09-17
Payer: COMMERCIAL

## 2024-09-17 DIAGNOSIS — M54.16 LUMBAR RADICULOPATHY: ICD-10-CM

## 2024-09-17 PROCEDURE — 97110 THERAPEUTIC EXERCISES: CPT | Mod: GP | Performed by: PHYSICAL THERAPIST

## 2024-09-17 NOTE — PROGRESS NOTES
"Physical Therapy    Physical Therapy Treatment    Patient Name: Dante Crawford  MRN: [id number]  Today's Date: 9/17/2024    Time Calculation  Start Time: 1430  Stop Time: 1511  Time Calculation (min): 41 min    Visit #: 7  out of 8/16  Insurance: ANTH GGM - NO AUTH / [place] VISIT   Evaluation date: 7/22/24      Current Problem:   1. Lumbar radiculopathy  Follow Up In Physical Therapy        MRI 8/19/24    SUBJECTIVE:   Pt reports pain in left leg woke up on Sat, and then improved, worsened Sunday at his shift.  Pt has not seen Dr Miles, since initial visit, pt is still awaiting L MRI  Compliant with HEP    Precautions: disc pathology being ruled out, Co morbidities that may effect rx:Paranoid schizophrenia (stable)  Asthma asked pt to bring inhaler to his appt  Surgical History: none recent , pectus excavatum >10 yrs  CT lumbar spine wo IV contrast per ED   Final Result  \"No fracture or malalignment.  No significant central canal or neural  foraminal stenosis.  MRI may be helpful for increased sensitivity.\"  Signed by Ladarius Chicas DO        Pain:   Start of session:  0/10 pain left leg feels tingling and weak       OBJECTIVE:   Pt with decreased trunk rotation in gait  Trial of LS distraction does not change tingling-leg pull    Side glides hips to RT in standing x10  Unable to tolerated JUNIE today without increased leg pain left   TA weakness noted today 4/5 left vs 5/5 right    previous  Lumbar AROM:   FLEX: 25% loss lt buttock to knee   EXT: WFL, pain free   SB: WFL, pain free to RT, pain to loft    ROT: WFL, pain free  Trial of LS distraction does not change tingling   Treatments:  Therapeutic Exercise: ( minutes)  Recumbent bike L3 x 5 min  Seated TVA x10  Standing hip ABD x10 ea. (DA support on physioball)DNP  Standing hip EXT x5 ea pain started to increase . (DA support on physioball)DNP  H/L TrA brace + bridge 2x15  Supine hamstring stretch L/R 20 sec x 3 each   Fig 4 stretch x3 20 sec   Prone lying x3 mins "   Prone press OE x3/6 sec-started to increase leg pain so deferred  Prone x 60 sec   Piriformis stretch x body worsened pain left buttock,no sx right  Prone gluteal sets x10/3 sec   Standing gastroc stretch at wall 20 sec x 2 L/R (added to HEP)  Sidelying clam x10 ea  Instructed in standing hip hinge with dowel practice-past     Manual Therapy: ( minutes)    Gait Training: ( minutes)    Neuromuscular Re-education: ( minutes)    Therapeutic Activity: ( minutes)     HEP:Access Code: [email or web address]  URL: [email or web address]  Date: 09/17/2024  Prepared by: Radha Nassar    Exercises  - Supine Hip External Rotation Stretch  - 2 x daily - 7 x weekly - 3 reps - 20 hold  Access Code: [email or web address]  URL: [email or web address]  Date: 07/29/2024  Prepared by: Radha Nassar    Exercises  - Supine Piriformis Stretch with Foot on Ground  - 2 x daily - 7 x weekly - 3 reps - 20 hold  - Supine Hip Adduction Isometric with Ball  - 3 x weekly - 10 reps - 3 hold  - Hooklying Clamshell with Resistance  - 3 x weekly - 2-3 sets - 10 reps  Access Code: [email or web address]  URL: [email or web address]  Date: 07/22/2024  Prepared by: Radha Nassar     Exercises  - Hooklying Single Knee to Chest Stretch  - 2 x daily - 7 x weekly - 3 reps - 10 hold--HOLD   - Supine Double Knee to Chest Modified  - 2 x daily - 7 x weekly - 3 reps - 10 hold--HOLD   - Supine Hamstring Stretch  - 2 x daily - 7 x weekly - 3 reps - 10 hold  - Prone Press Up On Elbows  - 7 x weekly - 10 reps - 6 hold   end with extension     ASSESSMENT:   Pt had >sx with JUNIE today  Pt having some feeling of more weakness in leg and DF weakness today   Not able to reduce tingling back pain feels good prone.    Post session pain:   denies     PLAN:  Pt will reach out to his doctor to inform them of feeling tingling and weakness of left leg  OP PT PLAN:  Treatment/Interventions: Education/Instruction , Manual Therapy  , Neuromuscular re-education , Self care/home management , Therapeutic  activities , and Therapeutic exercise    PT Plan: Skilled PT   PT Frequency: 2 times per week   Duration:8 weeks  16 sessions  Visits Approved: [place]  Rehab Potential: Good  Plan of Care Agreement: Patient         Goals:   ST  Pt  will be I with HEP-met  Pt will be able to don socks and shoes with functional bending without pain.-met  Pt will report 25% reduction in pain-PROGRESSING   LT  Pt will improve ANA scores from 40% to 20%indicating improved function -PROGRESSING   Pt will improve hamstring flexibility 10 deg-PROGRESSING   Pt will demo safe body mechanics with bed transfers-PROGRESSING   Pt will demo hip hinge technique for lifting -PROGRESSING

## 2024-09-30 ENCOUNTER — APPOINTMENT (OUTPATIENT)
Dept: RADIOLOGY | Facility: CLINIC | Age: 24
End: 2024-09-30
Payer: COMMERCIAL

## 2024-09-30 DIAGNOSIS — M54.16 LUMBAR RADICULOPATHY, ACUTE: ICD-10-CM

## 2024-09-30 PROCEDURE — 72148 MRI LUMBAR SPINE W/O DYE: CPT

## 2024-09-30 PROCEDURE — 72148 MRI LUMBAR SPINE W/O DYE: CPT | Performed by: RADIOLOGY

## 2024-11-13 ENCOUNTER — ANCILLARY PROCEDURE (OUTPATIENT)
Dept: URGENT CARE | Age: 24
End: 2024-11-13
Payer: COMMERCIAL

## 2024-11-13 ENCOUNTER — OFFICE VISIT (OUTPATIENT)
Dept: URGENT CARE | Age: 24
End: 2024-11-13
Payer: COMMERCIAL

## 2024-11-13 VITALS
HEIGHT: 66 IN | BODY MASS INDEX: 20.89 KG/M2 | HEART RATE: 74 BPM | DIASTOLIC BLOOD PRESSURE: 81 MMHG | SYSTOLIC BLOOD PRESSURE: 126 MMHG | TEMPERATURE: 97.4 F | OXYGEN SATURATION: 95 % | WEIGHT: 130 LBS

## 2024-11-13 DIAGNOSIS — R05.9 COUGH, UNSPECIFIED TYPE: ICD-10-CM

## 2024-11-13 DIAGNOSIS — J40 BRONCHITIS: Primary | ICD-10-CM

## 2024-11-13 DIAGNOSIS — R68.89 FLU-LIKE SYMPTOMS: ICD-10-CM

## 2024-11-13 LAB
POC RAPID INFLUENZA A: NEGATIVE
POC RAPID INFLUENZA B: NEGATIVE
POC SARS-COV-2 AG BINAX: NORMAL

## 2024-11-13 PROCEDURE — 71046 X-RAY EXAM CHEST 2 VIEWS: CPT | Performed by: NURSE PRACTITIONER

## 2024-11-13 RX ORDER — AZITHROMYCIN 250 MG/1
TABLET, FILM COATED ORAL
Qty: 6 TABLET | Refills: 0 | Status: SHIPPED | OUTPATIENT
Start: 2024-11-13 | End: 2024-11-18

## 2024-11-13 RX ORDER — BENZONATATE 100 MG/1
100 CAPSULE ORAL EVERY 6 HOURS PRN
Qty: 20 CAPSULE | Refills: 0 | Status: SHIPPED | OUTPATIENT
Start: 2024-11-13 | End: 2025-11-13

## 2024-11-13 RX ORDER — METHYLPREDNISOLONE 4 MG/1
TABLET ORAL
Qty: 21 TABLET | Refills: 0 | Status: SHIPPED | OUTPATIENT
Start: 2024-11-13 | End: 2024-11-20

## 2024-11-13 RX ORDER — ALBUTEROL SULFATE 90 UG/1
2 INHALANT RESPIRATORY (INHALATION) EVERY 6 HOURS PRN
Qty: 18 G | Refills: 0 | Status: SHIPPED | OUTPATIENT
Start: 2024-11-13 | End: 2025-11-13

## 2024-11-13 ASSESSMENT — ENCOUNTER SYMPTOMS
WHEEZING: 1
COUGH: 1
RHINORRHEA: 1
CHILLS: 1
FEVER: 1
CHEST TIGHTNESS: 1

## 2024-11-13 NOTE — PROGRESS NOTES
"Subjective   Patient ID: Dante Crawford is a 23 y.o. male. They present today with a chief complaint of Cough (Exposure to covid. Past few days. Coughing, wheezing, yellow mucous).    History of Present Illness  Dante Crawford is a 23 y.o. male who presents to the clinic for cough, chest tightness, wheezing, intermittent fevers, rhinorrhea, chills, body aches for the past 4-5 days.  Pt denies any chest pain, sob, N/V at this time in clinic.             Past Medical History  Allergies as of 11/13/2024 - Reviewed 11/13/2024   Allergen Reaction Noted    Sulfa (sulfonamide antibiotics) Hives and Rash 09/29/2008    Tree nuts Swelling 11/25/2003    Amoxicillin Unknown 05/09/2003    Peanut Other 11/25/2003       (Not in a hospital admission)       Past Medical History:   Diagnosis Date    Allergic        Past Surgical History:   Procedure Laterality Date    CHEST SURGERY          reports that he has never smoked. He has never been exposed to tobacco smoke. He has never used smokeless tobacco. He reports that he does not drink alcohol and does not use drugs.    Review of Systems  Review of Systems   Constitutional:  Positive for chills and fever.   HENT:  Positive for congestion and rhinorrhea.    Respiratory:  Positive for cough, chest tightness and wheezing.    All other systems reviewed and are negative.                                 Objective    Vitals:    11/13/24 1022   BP: 126/81   Pulse: 74   Temp: 36.3 °C (97.4 °F)   TempSrc: Oral   SpO2: 95%   Weight: 59 kg (130 lb)   Height: 1.676 m (5' 6\")     No LMP for male patient.    Physical Exam  Constitutional:       Appearance: Normal appearance.   HENT:      Right Ear: Tympanic membrane normal.      Left Ear: Tympanic membrane normal.      Mouth/Throat:      Pharynx: Posterior oropharyngeal erythema and postnasal drip present.   Eyes:      Extraocular Movements: Extraocular movements intact.      Conjunctiva/sclera: Conjunctivae normal.      Pupils: Pupils are equal, round, " and reactive to light.   Cardiovascular:      Rate and Rhythm: Normal rate and regular rhythm.   Pulmonary:      Breath sounds: Wheezing and rhonchi present.      Comments: Rhonchi- RLL  Neurological:      General: No focal deficit present.      Mental Status: He is alert and oriented to person, place, and time. Mental status is at baseline.   Psychiatric:         Mood and Affect: Mood normal.         Behavior: Behavior normal.         Procedures    Point of Care Test & Imaging Results from this visit  Results for orders placed or performed in visit on 11/13/24   POCT BinaxNOW Covid-19 Ag Card manually resulted   Result Value Ref Range    POC HOWIE-COV-2 AG  Presumptive negative test for SARS-CoV-2 (no antigen detected)     Presumptive negative test for SARS-CoV-2 (no antigen detected)   POCT Influenza A/B manually resulted   Result Value Ref Range    POC Rapid Influenza A Negative Negative    POC Rapid Influenza B Negative Negative      XR chest 2 views    Result Date: 11/13/2024  Interpreted By:  Venita Acuna, STUDY: XR CHEST 2 VIEWS;  11/13/2024 11:16 am   INDICATION: Signs/Symptoms:cough.   COMPARISON: 10/13/2018   ACCESSION NUMBER(S): [id number]   ORDERING CLINICIAN: GIAN GILBERT   FINDINGS: CARDIOMEDIASTINAL SILHOUETTE: Cardiomediastinal silhouette is normal in size and configuration.     LUNGS: Lungs are clear.   ABDOMEN: No remarkable upper abdominal findings.     BONES: No acute osseous changes.       No acute cardiopulmonary process.   MACRO: None   Signed by: Venita Acuna 11/13/2024 11:22 AM Dictation workstation:   [id number]     Diagnostic study results (if any) were reviewed by CHRISTOPHER Ordonez-CNP.    Assessment/Plan   Allergies, medications, history, and pertinent labs/EKGs/Imaging reviewed by CHRISTOPHER Ordonez-CNP.     Medical Decision Making  MDM- History and exam consistent with acute bronchitis. No evidence of pneumonia, sepsis or other acute cardiopulmonary pathology. Based on  current exam XRAY is completed in office and negative for pneumonia or other significant pulmonary pathology. Based on current exam and past medical history, plan is for symptomatic therapies and steroids/ ABT. Patient advised to return to clinic or go to the ED if symptoms change or worsen.     Orders and Diagnoses  Diagnoses and all orders for this visit:  Bronchitis  -     methylPREDNISolone (Medrol Dospak) 4 mg tablets; Take as directed on package.  -     azithromycin (Zithromax) 250 mg tablet; Take 2 tabs (500 mg) by mouth today, than 1 daily for 4 days.  -     benzonatate (Tessalon Perles) 100 mg capsule; Take 1 capsule (100 mg) by mouth every 6 hours if needed for cough. Do not crush or chew.  -     albuterol 90 mcg/actuation inhaler; Inhale 2 puffs every 6 hours if needed for wheezing.  Cough, unspecified type  -     POCT BinaxNOW Covid-19 Ag Card manually resulted  -     XR chest 2 views; Future  Flu-like symptoms  -     POCT Influenza A/B manually resulted      Medical Admin Record      Patient disposition: Home    Electronically signed by CHRISTOPHER Ordonez-ELIAS  11:28 AM

## 2024-11-13 NOTE — PATIENT INSTRUCTIONS
Work note  Medrol dose pack  Zpack  As needed tessalon perles  As needed albuterol  If worsening, please go to ER    Please follow up with your primary provider within one week if symptoms do not improve.  You may schedule an appointment online at [email or web address] or call [phone]. Go to the Emergency Department if symptoms significantly worsen or if you develop chest pain or shortness of breath.

## 2024-11-13 NOTE — LETTER
November 13, 2024     Patient: Dante Crawford   YOB: 2000   Date of Visit: 11/13/2024       To Whom It May Concern:    Dante Crawford was seen in my clinic on 11/13/2024 at 10:15 am. Please excuse Dante for his absence from work on this day to make the appointment.    If you have any questions or concerns, please don't hesitate to call.         Sincerely,         Curtis Jaffe, APRN-CNP        CC: No Recipients

## 2024-11-13 NOTE — LETTER
November 13, 2024     Patient: Dante Crawford   YOB: 2000   Date of Visit: 11/13/2024       To Whom It May Concern:    Dante Crawford was seen in my clinic on 11/13/2024. Please excuse Dante for his absence from work on this day to make the appointment. Dante may return to work on 11/17/2024.     If you have any questions or concerns, please don't hesitate to call.         Sincerely,         Curtis Jaffe, APRN-CNP        CC: No Recipients

## 2024-12-12 ENCOUNTER — DOCUMENTATION (OUTPATIENT)
Dept: PHYSICAL THERAPY | Facility: CLINIC | Age: 24
End: 2024-12-12
Payer: COMMERCIAL

## 2024-12-12 ENCOUNTER — HOSPITAL ENCOUNTER (EMERGENCY)
Facility: HOSPITAL | Age: 24
Discharge: HOME | End: 2024-12-12
Attending: STUDENT IN AN ORGANIZED HEALTH CARE EDUCATION/TRAINING PROGRAM
Payer: COMMERCIAL

## 2024-12-12 VITALS
TEMPERATURE: 97.3 F | SYSTOLIC BLOOD PRESSURE: 116 MMHG | DIASTOLIC BLOOD PRESSURE: 76 MMHG | BODY MASS INDEX: 21.07 KG/M2 | OXYGEN SATURATION: 97 % | WEIGHT: 139 LBS | RESPIRATION RATE: 15 BRPM | HEIGHT: 68 IN | HEART RATE: 90 BPM

## 2024-12-12 DIAGNOSIS — M54.10 RADICULAR LOW BACK PAIN: Primary | ICD-10-CM

## 2024-12-12 PROCEDURE — 99283 EMERGENCY DEPT VISIT LOW MDM: CPT | Performed by: STUDENT IN AN ORGANIZED HEALTH CARE EDUCATION/TRAINING PROGRAM

## 2024-12-12 PROCEDURE — 2500000005 HC RX 250 GENERAL PHARMACY W/O HCPCS: Performed by: STUDENT IN AN ORGANIZED HEALTH CARE EDUCATION/TRAINING PROGRAM

## 2024-12-12 PROCEDURE — 2500000004 HC RX 250 GENERAL PHARMACY W/ HCPCS (ALT 636 FOR OP/ED): Performed by: STUDENT IN AN ORGANIZED HEALTH CARE EDUCATION/TRAINING PROGRAM

## 2024-12-12 RX ORDER — LIDOCAINE 50 MG/G
1 PATCH TOPICAL DAILY
Qty: 7 PATCH | Refills: 0 | Status: SHIPPED | OUTPATIENT
Start: 2024-12-12

## 2024-12-12 RX ORDER — PREDNISONE 10 MG/1
20 TABLET ORAL DAILY
Qty: 8 TABLET | Refills: 0 | Status: SHIPPED | OUTPATIENT
Start: 2024-12-12 | End: 2024-12-12 | Stop reason: WASHOUT

## 2024-12-12 RX ORDER — LIDOCAINE 50 MG/G
1 PATCH TOPICAL DAILY
Qty: 7 PATCH | Refills: 0 | Status: SHIPPED | OUTPATIENT
Start: 2024-12-12 | End: 2024-12-12 | Stop reason: WASHOUT

## 2024-12-12 RX ORDER — PREDNISONE 50 MG/1
50 TABLET ORAL DAILY
Qty: 4 TABLET | Refills: 0 | Status: SHIPPED | OUTPATIENT
Start: 2024-12-12 | End: 2024-12-16

## 2024-12-12 RX ORDER — LIDOCAINE 560 MG/1
1 PATCH PERCUTANEOUS; TOPICAL; TRANSDERMAL ONCE
Status: DISCONTINUED | OUTPATIENT
Start: 2024-12-12 | End: 2024-12-12 | Stop reason: HOSPADM

## 2024-12-12 ASSESSMENT — PAIN DESCRIPTION - FREQUENCY: FREQUENCY: CONSTANT/CONTINUOUS

## 2024-12-12 ASSESSMENT — PAIN DESCRIPTION - DESCRIPTORS: DESCRIPTORS: ACHING

## 2024-12-12 ASSESSMENT — PAIN DESCRIPTION - ORIENTATION: ORIENTATION: RIGHT

## 2024-12-12 ASSESSMENT — PAIN DESCRIPTION - PROGRESSION: CLINICAL_PROGRESSION: GRADUALLY WORSENING

## 2024-12-12 ASSESSMENT — PAIN SCALES - GENERAL: PAINLEVEL_OUTOF10: 7

## 2024-12-12 ASSESSMENT — PAIN DESCRIPTION - LOCATION: LOCATION: LEG

## 2024-12-12 ASSESSMENT — PAIN DESCRIPTION - ONSET: ONSET: ONGOING

## 2024-12-12 ASSESSMENT — PAIN - FUNCTIONAL ASSESSMENT: PAIN_FUNCTIONAL_ASSESSMENT: 0-10

## 2024-12-12 NOTE — ED TRIAGE NOTES
Pt states right leg pain for 8 years. States he has tried to get it investigated but the pain has been worse the last 4 days. Reports pain shooting down his leg

## 2024-12-12 NOTE — ED PROVIDER NOTES
HPI   Chief Complaint   Patient presents with    leg pain for 8 years       24-year-old male presented to the emergency department for recurrence of right-sided low back pain with radicular symptoms.  Endorses pain starting in the right low back, buttock that radiates down the posterior aspect of the right leg.  Has had prior CT and MRI imaging.  Previously saw orthopedics and had physical therapy.  Denies any falls or trauma.  Denies any bowel or bladder incontinence, IV drug use, known history of cancer.  He denies any leg weakness.  Requesting work note.  Has not followed up with physical therapy or orthopedic spine since having normal MRI in September 2024.              Patient History   Past Medical History:   Diagnosis Date    Allergic      Past Surgical History:   Procedure Laterality Date    CHEST SURGERY       Family History   Problem Relation Name Age of Onset    No Known Problems Mother      No Known Problems Sister       Social History     Tobacco Use    Smoking status: Never     Passive exposure: Never    Smokeless tobacco: Never   Vaping Use    Vaping status: Never Used   Substance Use Topics    Alcohol use: Never    Drug use: Never       Physical Exam   ED Triage Vitals [12/12/24 0623]   Temperature Heart Rate Respirations BP   36.6 °C (97.9 °F) 66 16 138/78      SpO2 Temp Source Heart Rate Source Patient Position   -- Temporal -- Sitting      BP Location FiO2 (%)     Left arm --       Physical Exam  Vitals and nursing note reviewed.   Constitutional:       General: He is not in acute distress.     Appearance: He is well-developed.   HENT:      Head: Normocephalic and atraumatic.      Nose: No congestion or rhinorrhea.   Eyes:      Conjunctiva/sclera: Conjunctivae normal.   Cardiovascular:      Rate and Rhythm: Normal rate and regular rhythm.      Pulses: Normal pulses.      Heart sounds: No murmur heard.  Pulmonary:      Effort: Pulmonary effort is normal. No respiratory distress.      Breath sounds:  Normal breath sounds. No stridor. No wheezing, rhonchi or rales.   Abdominal:      General: Bowel sounds are normal. There is no distension.      Palpations: Abdomen is soft.      Tenderness: There is no abdominal tenderness. There is no guarding or rebound.   Musculoskeletal:         General: No swelling.      Cervical back: Neck supple. No rigidity.      Right lower leg: No edema.      Left lower leg: No edema.      Comments: Tenderness to the right paraspinal musculature of the low back as well as the right gluteus region/psoas region.  Positive straight leg raise of the right.  No bony tenderness of the midline thoracic, lumbar spine or hip/greater trochanter.  Full range of motion of the knee with no joint effusion of the knee or ankle.  2+ her Cross pedis and posterior tibial pulses.  All compartments are soft.   Skin:     General: Skin is warm and dry.      Capillary Refill: Capillary refill takes less than 2 seconds.      Findings: No rash.   Neurological:      Mental Status: He is alert.      Cranial Nerves: No cranial nerve deficit.      Sensory: No sensory deficit.      Motor: No weakness.      Gait: Gait normal.      Comments: Cranial nerves II through XII intact.  Strength 5 out of 5 in all 4 extremities.  Sensation intact to light touch in all 4 extremities.  No dysdiadochokinesia, no dysmetria.  Gait is narrow and stable.  Normal sensation of the bilateral lower extremities including over the medial thigh.   Psychiatric:         Mood and Affect: Mood normal.         Behavior: Behavior normal.         Thought Content: Thought content normal.           ED Course [facility]   ED Course as of 12/12/24 0712   Thu Dec 12, 2024   0628 FINDINGS:  Segmentation: Normal.      Conus: The lower thoracic cord appears unremarkable. The conus  terminates at the T12-L1 interspace level. Cauda equina are  unremarkable.      Epidural fluid: None.      Alignment: Normal.      Vertebral bodies: Normal.      Marrow signal:  Normal.      Intervertebral discs: Normal signal.          Degenerative change:      T12-L1: Unremarkable.      L1-2: Unremarkable.      L2-3: Unremarkable.      L3-4: Unremarkable.      L4-5: Mild ligamentum flavum thickening. Minimal disc bulging. No  spinal canal stenosis. No neural foraminal narrowing.      L5-S1: Unremarkable.      Soft tissues: The prevertebral and posterior paraspinal soft tissues  are unremarkable.      IMPRESSION:  Normal MR appearance of the lumbar spine.   Prior MRI results reviewed. [TL]      ED Course User Index  [TL] Mic Robles DO         Diagnoses as of 12/12/24 0712   Radicular low back pain                 No data recorded     [place] Coma Scale Score: 15 (12/12/24 0623 : Catherine Heart RN)                           Medical Decision Making  Overall well-appearing 24-year-old male presented to the emerged part for right low back pain that radiates on the right leg with a positive straight leg raise.  No trauma or midline tenderness.  I do not believe he has an acute fracture.  This has been recurrent for him unfortunately.  I recommend continued use of NSAIDs, Tylenol at home and will prescribe prednisone with a 5-day course with first dose given the emergency department.  I will also prescribe lidocaine patches which she can utilize.  I advised him to follow-up with the spine team and to discuss with them need for recurrent physical therapy or stretching exercises.  Return precautions explained to him and discharged stable condition.  I do not suspect cauda equina, central cord syndrome as these were considered.  No sign of neurologic compensation.  No bowel or bladder incontinence, saddle anesthesia, neurologic decompensation.        Procedure  Procedures     Mic Robles DO  12/12/24 0714

## 2024-12-12 NOTE — Clinical Note
Dante Crawford was seen and treated in our emergency department on 12/12/2024.  He may return to work on 12/13/2024.       If you have any questions or concerns, please don't hesitate to call.      Mic Robles, DO

## 2024-12-12 NOTE — PROGRESS NOTES
Physical Therapy    Discharge Summary    Name: Dante Crawford  MRN: [id number]  : 2000  Date: 24    Discharge Summary: PT    Discharge Information: Date of discharge 24, Date of last visit 24, Number of attended visits 7, Referred by Pacheco, and Referred for l radicular pain     Therapy Summary: pt was seen for 7 sessions consisting of;manual, distraction, werner core, spinal mobility.  Pt sx could be quite variable with reports of severe exacerbations and left le pain and weakness     Discharge Status: pt did not return      Rehab Discharge Reason: Failed to schedule and/or keep follow-up appointment(s)

## 2024-12-30 PROBLEM — T50.902A SUICIDE BY DRUG OVERDOSE (MULTI): Status: ACTIVE | Noted: 2024-12-30

## 2024-12-30 PROBLEM — T50.901A POISONING BY DRUG: Status: ACTIVE | Noted: 2024-12-30

## 2024-12-30 PROBLEM — R55 SYNCOPE AND COLLAPSE: Status: ACTIVE | Noted: 2024-12-30

## 2024-12-30 PROBLEM — A41.9 SEPTICEMIA (MULTI): Status: ACTIVE | Noted: 2018-10-13

## 2024-12-30 PROBLEM — F28: Status: ACTIVE | Noted: 2018-10-08

## 2024-12-30 PROBLEM — R79.89 LFT ELEVATION: Status: ACTIVE | Noted: 2018-10-14

## 2024-12-30 PROBLEM — Z78.9 TRANSITION OF CARE PERFORMED WITH SHARING OF CLINICAL SUMMARY: Status: ACTIVE | Noted: 2018-10-14

## 2024-12-30 PROBLEM — A41.9 SEPSIS (MULTI): Status: ACTIVE | Noted: 2024-12-30

## 2024-12-30 PROBLEM — M62.82 NON-TRAUMATIC RHABDOMYOLYSIS: Status: ACTIVE | Noted: 2018-10-14

## 2024-12-30 PROBLEM — N39.0 URINARY TRACT INFECTION: Status: ACTIVE | Noted: 2018-10-13

## 2025-01-06 NOTE — PROGRESS NOTES
"It was a pleasure to see Mr. Crawford at the Neurosurgery [facility] at [facility].     He is a really nice 24 y.o. male  who presents to us with complaints {pain location:64602::\"primarily axial LOWER BACK pain \"} that started about  {112:07094} {time; units:25201} ago, and have been {course:17::\"unchanged\"} since that time.  The symptoms started after {causes; back pain:45953::\"no known injury\"}    The pain is {NUMBERS 0-10:36289} /10. The pain is described as {pain quality:30196} and occurs {timin}.  Symptoms are exacerbated by {causes; aggravators pain back:72557}. Factors which relieve the pain include {alleviated by:35428}      Numbness and/or tingling - {YesOrNo:62626::\"NO\"}    Weakness : {YesOrNo:12640::\"NO\"}    Bladder/Bowel symptoms - {YesOrNo:76045::\"NO\"}    The patient has tried medications (eg: gabapentin, NSAIDS and narcotics ) : {yes/no:36839}  PT : {YES/DATE/NO:14586}  Pain Management with ESIs/selective nerve blocks  - {YesOrNo:36715::\"NO\"}    he is a {MKmedicalrisk:08936::\"NON-SMOKER\",\"NON-DIABETIC\"}    History of Depression : {YesOrNo:66286::\"NO\"}    PRIOR SPINE SURGERY: {YesOrNo:88575::\"NO\"}    Denies use of Aspirin, Coumadin, or Plavix or any other anticoagulants     NARCOTICS for pain : {YesOrNo:74780::\"NO\"}    Part of this patient’s history is from personal review of the patient’s previous charts.      Past Medical History:   Diagnosis Date    Allergic            Current Outpatient Medications:     albuterol 90 mcg/actuation inhaler, Inhale 2 puffs every 6 hours if needed for wheezing., Disp: 18 g, Rfl: 0    benzonatate (Tessalon Perles) 100 mg capsule, Take 1 capsule (100 mg) by mouth every 6 hours if needed for cough. Do not crush or chew., Disp: 20 capsule, Rfl: 0    lidocaine (Lidoderm) 5 % patch, Place 1 patch over 12 hours on the skin once daily. Remove & discard patch within 12 hours of application., Disp: 7 patch, Rfl: 0      Review of Systems :         EXAM:   There " were no vitals filed for this visit.      IMAGING:   ***    ASSESSMENT AND PLAN:  ***        Carlos Elizondo MD, [facility]   of Neurological Surgery  [employer] [facility] [employer]  Attending Surgeon  Director - Minimally Invasive Spine Surgery  [place], [place]      Some of this note was completed using Dragon voice recognition technology and sometimes the software misinterprets words. This may include unintended errors with respect to translation of words, typographical errors or grammar errors which may not have been identified prior to finalization of the chart note. Please take this into account when reading this note

## 2025-01-07 ENCOUNTER — APPOINTMENT (OUTPATIENT)
Dept: NEUROSURGERY | Facility: CLINIC | Age: 25
End: 2025-01-07
Payer: COMMERCIAL

## 2025-01-19 ENCOUNTER — OFFICE VISIT (OUTPATIENT)
Dept: URGENT CARE | Age: 25
End: 2025-01-19
Payer: COMMERCIAL

## 2025-01-19 VITALS
DIASTOLIC BLOOD PRESSURE: 69 MMHG | RESPIRATION RATE: 18 BRPM | TEMPERATURE: 99 F | HEART RATE: 93 BPM | SYSTOLIC BLOOD PRESSURE: 129 MMHG | OXYGEN SATURATION: 95 %

## 2025-01-19 DIAGNOSIS — R68.89 FLU-LIKE SYMPTOMS: ICD-10-CM

## 2025-01-19 DIAGNOSIS — U07.1 COVID-19 VIRUS INFECTION: Primary | ICD-10-CM

## 2025-01-19 LAB
POC RAPID INFLUENZA A: NEGATIVE
POC RAPID INFLUENZA B: NEGATIVE
POC SARS-COV-2 AG BINAX: ABNORMAL

## 2025-01-19 PROCEDURE — 87804 INFLUENZA ASSAY W/OPTIC: CPT | Performed by: PHYSICIAN ASSISTANT

## 2025-01-19 PROCEDURE — 87811 SARS-COV-2 COVID19 W/OPTIC: CPT | Performed by: PHYSICIAN ASSISTANT

## 2025-01-19 PROCEDURE — 99213 OFFICE O/P EST LOW 20 MIN: CPT | Performed by: PHYSICIAN ASSISTANT

## 2025-01-19 RX ORDER — IBUPROFEN 600 MG/1
600 TABLET ORAL EVERY 6 HOURS PRN
Qty: 28 TABLET | Refills: 0 | Status: SHIPPED | OUTPATIENT
Start: 2025-01-19 | End: 2025-01-26

## 2025-01-19 RX ORDER — BENZONATATE 200 MG/1
200 CAPSULE ORAL 3 TIMES DAILY PRN
Qty: 20 CAPSULE | Refills: 0 | Status: SHIPPED | OUTPATIENT
Start: 2025-01-19 | End: 2025-01-26

## 2025-01-19 RX ORDER — ACETAMINOPHEN 325 MG/1
650 TABLET ORAL EVERY 6 HOURS PRN
Qty: 30 TABLET | Refills: 0 | Status: SHIPPED | OUTPATIENT
Start: 2025-01-19 | End: 2025-01-29

## 2025-01-19 ASSESSMENT — ENCOUNTER SYMPTOMS
COUGH: 1
FEVER: 1
CHILLS: 1

## 2025-01-19 NOTE — LETTER
January 19, 2025     Patient: Dante Crawford   YOB: 2000   Date of Visit: 1/19/2025       To Whom It May Concern:    It is my medical opinion that Dante Crawford may return to work on 1/25/24 .    If you have any questions or concerns, please don't hesitate to call.         Sincerely,        Shawn Xiao PA-C    CC: No Recipients

## 2025-01-19 NOTE — PROGRESS NOTES
Subjective   Patient ID: Dante Crawford is a 24 y.o. male. They present today with a chief complaint of Cough and Generalized Body Aches (X 1 day).  Patient reports has been sick for the past few days.  Not taking any medication for symptoms.  Chills, body aches.  Cough nonproductive.    Past Medical History  Allergies as of 01/19/2025 - Reviewed 01/19/2025   Allergen Reaction Noted    Sulfa (sulfonamide antibiotics) Hives and Rash 09/29/2008    Tree nuts Swelling 11/25/2003    Amoxicillin Unknown 05/09/2003    Peanut Other 11/25/2003       (Not in a hospital admission)       Past Medical History:   Diagnosis Date    Allergic        Past Surgical History:   Procedure Laterality Date    CHEST SURGERY          reports that he has never smoked. He has never been exposed to tobacco smoke. He has never used smokeless tobacco. He reports that he does not drink alcohol and does not use drugs.    Review of Systems  Review of Systems   Constitutional:  Positive for chills and fever.   HENT:  Positive for congestion.    Respiratory:  Positive for cough.                                   Objective    Vitals:    01/19/25 1045   BP: 129/69   Pulse: 93   Resp: 18   Temp: 37.2 °C (99 °F)   SpO2: 95%     No LMP for male patient.    Physical Exam  Vitals and nursing note reviewed.   Constitutional:       Appearance: Normal appearance.   Eyes:      Conjunctiva/sclera: Conjunctivae normal.   Cardiovascular:      Rate and Rhythm: Normal rate.   Pulmonary:      Effort: Pulmonary effort is normal.   Neurological:      Mental Status: He is alert.         Procedures    Point of Care Test & Imaging Results from this visit  Results for orders placed or performed in visit on 01/19/25   POCT Influenza A/B manually resulted   Result Value Ref Range    POC Rapid Influenza A Negative Negative    POC Rapid Influenza B Negative Negative   POCT BinaxNOW Covid-19 Ag Card manually resulted   Result Value Ref Range    POC HOWIE-COV-2 AG Positive test for  SARS-CoV-2 (antigen detected) (A) Presumptive negative test for SARS-CoV-2 (no antigen detected)      No results found.    Diagnostic study results (if any) were reviewed by Shawn Xiao PA-C.    Assessment/Plan   Allergies, medications, history, and pertinent labs/EKGs/Imaging reviewed by Shawn Xiao PA-C.     Medical Decision Making  Influenza negative.  Patient tested positive for COVID virus infection.  Education on supportive measures and return precautions.  Tylenol, ibuprofen, Tessalon Perle.  No hypoxia or respiratory distress.  Vital stable.    Orders and Diagnoses  Diagnoses and all orders for this visit:  COVID-19 virus infection  -     ibuprofen 600 mg tablet; Take 1 tablet (600 mg) by mouth every 6 hours if needed for mild pain (1 - 3) for up to 7 days.  -     benzonatate (Tessalon) 200 mg capsule; Take 1 capsule (200 mg) by mouth 3 times a day as needed for cough for up to 7 days. Do not crush or chew.  -     acetaminophen (TylenoL) 325 mg tablet; Take 2 tablets (650 mg) by mouth every 6 hours if needed for mild pain (1 - 3) for up to 10 days.  Flu-like symptoms  -     POCT Influenza A/B manually resulted  -     POCT BinaxNOW Covid-19 Ag Card manually resulted      Medical Admin Record      Patient disposition: Home    Electronically signed by Shawn Xiao PA-C  11:04 AM

## 2025-02-03 ENCOUNTER — OFFICE VISIT (OUTPATIENT)
Dept: PRIMARY CARE | Facility: CLINIC | Age: 25
End: 2025-02-03
Payer: COMMERCIAL

## 2025-02-03 VITALS
TEMPERATURE: 97.3 F | BODY MASS INDEX: 20.88 KG/M2 | OXYGEN SATURATION: 96 % | HEART RATE: 62 BPM | SYSTOLIC BLOOD PRESSURE: 124 MMHG | DIASTOLIC BLOOD PRESSURE: 80 MMHG | WEIGHT: 137.8 LBS | HEIGHT: 68 IN

## 2025-02-03 DIAGNOSIS — M54.10 RADICULAR PAIN OF LEFT LOWER EXTREMITY: Primary | ICD-10-CM

## 2025-02-03 PROBLEM — N39.0 URINARY TRACT INFECTION: Status: RESOLVED | Noted: 2018-10-13 | Resolved: 2025-02-03

## 2025-02-03 PROBLEM — A41.9 SEPTICEMIA (MULTI): Status: RESOLVED | Noted: 2018-10-13 | Resolved: 2025-02-03

## 2025-02-03 PROBLEM — A41.9 SEPSIS (MULTI): Status: RESOLVED | Noted: 2024-12-30 | Resolved: 2025-02-03

## 2025-02-03 PROBLEM — R79.89 LFT ELEVATION: Status: RESOLVED | Noted: 2018-10-14 | Resolved: 2025-02-03

## 2025-02-03 PROBLEM — T50.901A POISONING BY DRUG: Status: RESOLVED | Noted: 2024-12-30 | Resolved: 2025-02-03

## 2025-02-03 PROCEDURE — 3008F BODY MASS INDEX DOCD: CPT | Performed by: FAMILY MEDICINE

## 2025-02-03 PROCEDURE — 99214 OFFICE O/P EST MOD 30 MIN: CPT | Performed by: FAMILY MEDICINE

## 2025-02-03 RX ORDER — IBUPROFEN 600 MG/1
600 TABLET ORAL EVERY 6 HOURS PRN
COMMUNITY

## 2025-02-03 ASSESSMENT — PATIENT HEALTH QUESTIONNAIRE - PHQ9
2. FEELING DOWN, DEPRESSED OR HOPELESS: NOT AT ALL
SUM OF ALL RESPONSES TO PHQ9 QUESTIONS 1 AND 2: 0
1. LITTLE INTEREST OR PLEASURE IN DOING THINGS: NOT AT ALL

## 2025-02-03 ASSESSMENT — LIFESTYLE VARIABLES: HOW MANY STANDARD DRINKS CONTAINING ALCOHOL DO YOU HAVE ON A TYPICAL DAY: PATIENT DOES NOT DRINK

## 2025-02-03 ASSESSMENT — PAIN SCALES - GENERAL: PAINLEVEL_OUTOF10: 2

## 2025-02-03 NOTE — LETTER
"February 3, 2025     Patient: Dante Crawford   YOB: 2000   Date of Visit: 2/3/2025       To Whom It May Concern:    Dante Crawford was seen in my clinic on 2/3/2025 at 10:45 am. Please excuse Dante for his absence from work on this day to make the appointment.    Due to ongoing medical work up involving legs, patient should currently be restricted from \"high lift\" role.       If you have any questions or concerns, please don't hesitate to call.         Sincerely,         Juventino White MD        CC: No Recipients  "

## 2025-02-03 NOTE — PROGRESS NOTES
History Of Present Illness  Dante Crawford is a 24 y.o. male presenting for Pain (Concerns with bilat leg pain; ongoing for 8yrs. Needs referral to neurologist./Needs a note stating pt is unable to operate High lift.)  .    HPI   Has had chronic pain in the left lower extremity for months.  He completed physical therapy for 8 weeks without improvement in symptoms.  Had MRI of the lumbar spine on 9/30/2024 which was normal.  Last visit to orthopedics was 8/1/2024.    Despite all of this he complains of persistent discomfort, left leg feels pulsating pain and numbness.  Positional changes do not improve symptoms.  No bowel or bladder dysfunction.  No weakness or feet dragging.  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  There is a role he is being asked to complete at work which involves squatting and that does worsen his pain.    He was in the emergency room on 12/12/2024 due to exacerbation of the pain and was given a referral to neurosurgery.  He reports he attempted to schedule with neurosurgery but they canceled as it was not a surgical fix.  He was prescribed a 5-day course of prednisone which today he reports  did not improve his symptoms.      Past Medical History  Patient Active Problem List    Diagnosis Date Noted    Suicide by drug overdose (Multi) 12/30/2024    Syncope and collapse 12/30/2024    Lumbar radiculopathy 07/22/2024    Paranoid schizophrenia (Multi) 07/15/2024    Non-traumatic rhabdomyolysis 10/14/2018    Transition of care performed with sharing of clinical summary 10/14/2018    Other specified schizophrenia spectrum and other psychotic disorder 10/08/2018    Myopia 11/23/2015    Episodic mood disorder (CMS-[facility]) 09/15/2014    Behavior concern 02/22/2014    Pectus excavatum 03/19/2012    Allergic rhinitis due to pollen 05/09/2007    Allergic rhinitis due to animal (cat) (dog) hair and dander 04/11/2007    Asthma 04/11/2007    Allergic rhinitis  07/13/2004        Medications  Current Outpatient Medications   Medication Sig Dispense Refill    albuterol 90 mcg/actuation inhaler Inhale 2 puffs every 6 hours if needed for wheezing. 18 g 0    benzonatate (Tessalon Perles) 100 mg capsule Take 1 capsule (100 mg) by mouth every 6 hours if needed for cough. Do not crush or chew. 20 capsule 0    ibuprofen 600 mg tablet Take 1 tablet (600 mg) by mouth every 6 hours if needed for mild pain (1 - 3).       No current facility-administered medications for this visit.        Surgical History  He has a past surgical history that includes Chest surgery.     Social History  He reports that he has never smoked. He has never been exposed to tobacco smoke. He has never used smokeless tobacco. He reports that he does not drink alcohol and does not use drugs.    Family History  Family History   Problem Relation Name Age of Onset    No Known Problems Mother      No Known Problems Sister          Allergies  Sulfa (sulfonamide antibiotics), Tree nuts, Amoxicillin, and Peanut    Immunizations  Immunization History   Administered Date(s) Administered    COVID-19, mRNA, LNP-S, PF, 30 mcg/0.3 mL dose 06/20/2021, 07/11/2021    DTaP vaccine, pediatric  (INFANRIX) 04/03/2000, 05/30/2000, 07/05/2001    DTaP vaccine, pediatric (DAPTACEL) 08/04/2005    DTaP, Unspecified 02/01/2000    Flu vaccine (IIV4), preservative free *Check age/dose* 12/26/2014    HPV, Quadrivalent 03/19/2012, 05/21/2012, 09/24/2012    Hep A, Unspecified 09/08/2007    Hepatitis A vaccine, age 19 years and greater (HAVRIX) 09/08/2007    Hepatitis B vaccine, 19 yrs and under (RECOMBIVAX, ENGERIX) 05/30/2000, 10/04/2000, 10/14/2000, 12/05/2000    Hepatitis B vaccine, adult *Check Product/Dose* 09/08/2007    Hib (HbOC) 02/01/2000, 04/03/2000, 05/30/2000, 07/05/2001    MMR vaccine, subcutaneous (MMR II) 02/02/2001, 12/01/2001    Meningococcal ACWY-D (Menactra) 4-valent conjugate vaccine 05/06/2016    Meningococcal MCV4,  "Unspecified 02/11/2011    Pneumococcal Conjugate PCV 7 07/05/2000, 10/14/2000, 12/05/2000    Poliovirus vaccine, subcutaneous (IPOL) 02/01/2000, 04/03/2000, 07/05/2001, 08/04/2005    Tdap vaccine, age 7 year and older (BOOSTRIX, ADACEL) 11/05/2011    Varicella vaccine, subcutaneous (VARIVAX) 02/02/2001, 02/11/2011        ROS  Negative, except as discussed in HPI     Vitals  /80   Pulse 62   Temp 36.3 °C (97.3 °F)   Ht 1.727 m (5' 8\")   Wt 62.5 kg (137 lb 12.8 oz)   SpO2 96%   BMI 20.95 kg/m²      Physical Exam  Vitals and nursing note reviewed.   Constitutional:       General: He is not in acute distress.     Appearance: Normal appearance.   Cardiovascular:      Rate and Rhythm: Normal rate and regular rhythm.      Heart sounds: Normal heart sounds.   Pulmonary:      Effort: No respiratory distress.      Breath sounds: Normal breath sounds.   Neurological:      General: No focal deficit present.      Mental Status: He is alert. Mental status is at baseline.         Relevant Results  Lab Results   Component Value Date    WBC 11.7 (H) 10/13/2018    WBC 14.6 (H) 10/08/2018    HGB 16.1 10/13/2018    HGB 14.3 10/08/2018    HCT 46.4 10/13/2018    HCT 42.5 10/08/2018    MCV 84.1 10/13/2018    MCV 85.7 10/08/2018     10/13/2018     10/08/2018     Lab Results   Component Value Date     10/13/2018     10/08/2018    K 4.1 10/13/2018    K 4.0 10/08/2018    CL 93 (L) 10/13/2018     10/08/2018    CO2 24 10/13/2018    CO2 23 (L) 10/08/2018    BUN 11 10/13/2018    BUN 9 10/08/2018    CREATININE 0.8 10/13/2018    CREATININE 1.0 10/08/2018    CALCIUM 9.6 10/13/2018    CALCIUM 8.5 10/08/2018    PROT 7.9 10/13/2018    PROT 6.5 10/08/2018    BILITOT 1.8 (H) 10/13/2018    BILITOT 1.5 (H) 10/08/2018    ALKPHOS 101 10/13/2018    ALKPHOS 85 10/08/2018    ALT 56 (H) 10/13/2018    ALT 21 10/08/2018    AST 68 (H) 10/13/2018    AST 68 (H) 10/08/2018    GLUCOSE 104 (H) 10/13/2018    GLUCOSE 94 " "10/08/2018     No results found for: \"HGBA1C\"  Lab Results   Component Value Date    TSH 1.36 10/13/2018    FREET4 1.5 10/13/2018      No results found for: \"CHOL\", \"TRIG\", \"HDL\", \"LDLDIRECT\"        Assessment/Plan   Dante was seen today for pain.  Diagnoses and all orders for this visit:  Radicular pain of left lower extremity (Primary)  -     EMG & nerve conduction; Future  -     Referral to Pain Medicine; Future         Counseling:   Medication education:   -Education:  The patient is counseled regarding potential side-effects of any and all new medications  -Understanding:  Patient expressed understanding of information discussed today  -Adherence:  No barriers to adherence identified    Final treatment plan is a result of shared decision making with patient.         Juventino White MD   "

## 2025-02-10 ENCOUNTER — HOSPITAL ENCOUNTER (OUTPATIENT)
Dept: NEUROLOGY | Facility: CLINIC | Age: 25
Discharge: HOME | End: 2025-02-10
Payer: COMMERCIAL

## 2025-02-10 DIAGNOSIS — M54.10 RADICULAR PAIN OF LEFT LOWER EXTREMITY: ICD-10-CM

## 2025-02-10 PROCEDURE — 95908 NRV CNDJ TST 3-4 STUDIES: CPT | Performed by: PSYCHIATRY & NEUROLOGY

## 2025-02-10 PROCEDURE — 95886 MUSC TEST DONE W/N TEST COMP: CPT | Performed by: PSYCHIATRY & NEUROLOGY

## 2025-03-03 ENCOUNTER — APPOINTMENT (OUTPATIENT)
Dept: PAIN MEDICINE | Facility: CLINIC | Age: 25
End: 2025-03-03
Payer: COMMERCIAL

## 2025-05-01 ENCOUNTER — OFFICE VISIT (OUTPATIENT)
Dept: URGENT CARE | Age: 25
End: 2025-05-01
Payer: COMMERCIAL

## 2025-05-01 VITALS
HEIGHT: 67 IN | RESPIRATION RATE: 19 BRPM | BODY MASS INDEX: 21.97 KG/M2 | WEIGHT: 140 LBS | HEART RATE: 77 BPM | SYSTOLIC BLOOD PRESSURE: 124 MMHG | TEMPERATURE: 98.5 F | OXYGEN SATURATION: 96 % | DIASTOLIC BLOOD PRESSURE: 53 MMHG

## 2025-05-01 DIAGNOSIS — M79.604 PAIN IN BOTH LOWER EXTREMITIES: Primary | ICD-10-CM

## 2025-05-01 DIAGNOSIS — M79.605 PAIN IN BOTH LOWER EXTREMITIES: Primary | ICD-10-CM

## 2025-05-01 PROCEDURE — 1036F TOBACCO NON-USER: CPT | Performed by: NURSE PRACTITIONER

## 2025-05-01 PROCEDURE — 3008F BODY MASS INDEX DOCD: CPT | Performed by: NURSE PRACTITIONER

## 2025-05-01 PROCEDURE — 99213 OFFICE O/P EST LOW 20 MIN: CPT | Performed by: NURSE PRACTITIONER

## 2025-05-01 ASSESSMENT — ENCOUNTER SYMPTOMS
ARTHRALGIAS: 1
MYALGIAS: 1
JOINT SWELLING: 1

## 2025-05-01 NOTE — PROGRESS NOTES
"Subjective   Patient ID: Dante Crawford is a 24 y.o. male. They present today with a chief complaint of Leg Pain (On and off leg pain and tremors for over 8 years. Last few weeks its been really bad switching from one leg to the other currently on right leg. No previous hx of accidents or falls. No neurological issues).    History of Present Illness  Dante Crawford is a 24 y.o. male who presents to the clinic for 8 years of bilateral leg pain/discomfort.  Pt describes the pain as weakness and pulsating pain down the leg. Patient states the pain will be in the right leg for a couple of days, left leg for a couple of days and there are days when there are no pain at all.  Patient states he saw a neurologist 2-1/2 months ago where they did a EMG and MRI of the lumbar spine both came back negative.  Patient states today he is having pain in the right leg. Pt denies any chest pain, sob, N/V at this time in clinic.             Past Medical History  Allergies as of 05/01/2025 - Reviewed 05/01/2025   Allergen Reaction Noted    Sulfa (sulfonamide antibiotics) Hives and Rash 09/29/2008    Tree nuts Swelling 11/25/2003    Amoxicillin Unknown 05/09/2003    Peanut Other 11/25/2003       Prescriptions Prior to Admission[1]     Medical History[2]    Surgical History[3]     reports that he has never smoked. He has never been exposed to tobacco smoke. He has never used smokeless tobacco. He reports that he does not drink alcohol and does not use drugs.    Review of Systems  Review of Systems   Musculoskeletal:  Positive for arthralgias, joint swelling and myalgias.        Bilateral lower extremity pain.   All other systems reviewed and are negative.                                 Objective    Vitals:    05/01/25 1627   BP: 124/53   Pulse: 77   Resp: 19   Temp: 36.9 °C (98.5 °F)   SpO2: 96%   Weight: 63.5 kg (140 lb)   Height: 1.702 m (5' 7\")     No LMP for male patient.    Physical Exam  Constitutional:       Appearance: Normal " appearance.   Musculoskeletal:      Cervical back: Normal.      Thoracic back: Normal.      Lumbar back: Tenderness present. Negative right straight leg raise test and negative left straight leg raise test.   Neurological:      General: No focal deficit present.      Mental Status: He is alert and oriented to person, place, and time. Mental status is at baseline.   Psychiatric:         Mood and Affect: Mood normal.         Behavior: Behavior normal.         Procedures    Point of Care Test & Imaging Results from this visit  No results found for this visit on 05/01/25.   Imaging  No results found.    Cardiology, Vascular, and Other Imaging  No other imaging results found for the past 2 days      Diagnostic study results (if any) were reviewed by MARIANA Ordonez.    Assessment/Plan   Allergies, medications, history, and pertinent labs/EKGs/Imaging reviewed by MARIANA Ordonez.     Medical Decision Making  Patient has a neurologist who performed a workup on the patient's leg pain.  Negative EMG and negative lumbar MRI.  Advised patient I can put a referral in for second opinion with neurology and orthopedics.  Advised patient to follow-up with them.  Advised patient if worsening symptoms to go to local emergency department for further evaluation.    Orders and Diagnoses  Diagnoses and all orders for this visit:  Pain in both lower extremities  -     Adult Referral to Orthopedics and Sports Medicine; Future  -     Referral to Neurology; Future      Medical Admin Record      Patient disposition: Home    Electronically signed by MARIANA Ordonez  5:05 PM           [1] (Not in a hospital admission)   [2]   Past Medical History:  Diagnosis Date    Allergic     Poisoning by drug 12/30/2024   [3]   Past Surgical History:  Procedure Laterality Date    CHEST SURGERY

## 2025-05-01 NOTE — PATIENT INSTRUCTIONS
Please call and follow-up with neurology-please call  referral line.  Please call and follow-up with orthopedics-please call referral line.  Please call your primary care doctor next 5 to 7 days.  If worsening symptoms, please go to local emergency department for further evaluation.    Please follow up with your primary provider within one week if symptoms do not improve.  You may schedule an appointment online at [email or web address] or call [phone]. Go to the Emergency Department if symptoms significantly worsen or if you develop chest pain or shortness of breath.

## 2025-05-02 ENCOUNTER — APPOINTMENT (OUTPATIENT)
Dept: RADIOLOGY | Facility: HOSPITAL | Age: 25
End: 2025-05-02
Payer: COMMERCIAL

## 2025-05-02 ENCOUNTER — HOSPITAL ENCOUNTER (EMERGENCY)
Facility: HOSPITAL | Age: 25
Discharge: HOME | End: 2025-05-03
Attending: STUDENT IN AN ORGANIZED HEALTH CARE EDUCATION/TRAINING PROGRAM
Payer: COMMERCIAL

## 2025-05-02 DIAGNOSIS — S83.005A CLOSED DISLOCATION OF LEFT PATELLA, INITIAL ENCOUNTER: Primary | ICD-10-CM

## 2025-05-02 DIAGNOSIS — S70.11XA CONTUSION OF RIGHT THIGH, INITIAL ENCOUNTER: ICD-10-CM

## 2025-05-02 PROCEDURE — 73552 X-RAY EXAM OF FEMUR 2/>: CPT | Mod: RIGHT SIDE | Performed by: STUDENT IN AN ORGANIZED HEALTH CARE EDUCATION/TRAINING PROGRAM

## 2025-05-02 PROCEDURE — 72170 X-RAY EXAM OF PELVIS: CPT

## 2025-05-02 PROCEDURE — 72170 X-RAY EXAM OF PELVIS: CPT | Performed by: STUDENT IN AN ORGANIZED HEALTH CARE EDUCATION/TRAINING PROGRAM

## 2025-05-02 PROCEDURE — 2500000004 HC RX 250 GENERAL PHARMACY W/ HCPCS (ALT 636 FOR OP/ED): Performed by: STUDENT IN AN ORGANIZED HEALTH CARE EDUCATION/TRAINING PROGRAM

## 2025-05-02 PROCEDURE — 99152 MOD SED SAME PHYS/QHP 5/>YRS: CPT | Performed by: STUDENT IN AN ORGANIZED HEALTH CARE EDUCATION/TRAINING PROGRAM

## 2025-05-02 PROCEDURE — 27560 TREAT KNEECAP DISLOCATION: CPT | Mod: LT | Performed by: STUDENT IN AN ORGANIZED HEALTH CARE EDUCATION/TRAINING PROGRAM

## 2025-05-02 PROCEDURE — 73560 X-RAY EXAM OF KNEE 1 OR 2: CPT | Mod: LT

## 2025-05-02 PROCEDURE — 99285 EMERGENCY DEPT VISIT HI MDM: CPT | Mod: 25 | Performed by: STUDENT IN AN ORGANIZED HEALTH CARE EDUCATION/TRAINING PROGRAM

## 2025-05-02 PROCEDURE — 73552 X-RAY EXAM OF FEMUR 2/>: CPT | Mod: RT

## 2025-05-02 PROCEDURE — 96375 TX/PRO/DX INJ NEW DRUG ADDON: CPT | Mod: 59

## 2025-05-02 PROCEDURE — 73590 X-RAY EXAM OF LOWER LEG: CPT | Mod: LEFT SIDE | Performed by: STUDENT IN AN ORGANIZED HEALTH CARE EDUCATION/TRAINING PROGRAM

## 2025-05-02 PROCEDURE — 73552 X-RAY EXAM OF FEMUR 2/>: CPT | Mod: LEFT SIDE | Performed by: STUDENT IN AN ORGANIZED HEALTH CARE EDUCATION/TRAINING PROGRAM

## 2025-05-02 PROCEDURE — 73590 X-RAY EXAM OF LOWER LEG: CPT | Mod: LT

## 2025-05-02 PROCEDURE — 73560 X-RAY EXAM OF KNEE 1 OR 2: CPT | Mod: LEFT SIDE | Performed by: STUDENT IN AN ORGANIZED HEALTH CARE EDUCATION/TRAINING PROGRAM

## 2025-05-02 PROCEDURE — 96374 THER/PROPH/DIAG INJ IV PUSH: CPT | Mod: 59

## 2025-05-02 PROCEDURE — 73552 X-RAY EXAM OF FEMUR 2/>: CPT | Mod: LT

## 2025-05-02 RX ORDER — HYDROMORPHONE HYDROCHLORIDE 1 MG/ML
1 INJECTION, SOLUTION INTRAMUSCULAR; INTRAVENOUS; SUBCUTANEOUS ONCE
Status: COMPLETED | OUTPATIENT
Start: 2025-05-02 | End: 2025-05-02

## 2025-05-02 RX ORDER — ONDANSETRON HYDROCHLORIDE 2 MG/ML
4 INJECTION, SOLUTION INTRAVENOUS ONCE
Status: COMPLETED | OUTPATIENT
Start: 2025-05-02 | End: 2025-05-02

## 2025-05-02 RX ORDER — PROPOFOL 10 MG/ML
0.5 INJECTION, EMULSION INTRAVENOUS AS NEEDED
Status: DISCONTINUED | OUTPATIENT
Start: 2025-05-02 | End: 2025-05-03 | Stop reason: HOSPADM

## 2025-05-02 RX ORDER — PROPOFOL 10 MG/ML
1 INJECTION, EMULSION INTRAVENOUS ONCE
Status: COMPLETED | OUTPATIENT
Start: 2025-05-02 | End: 2025-05-02

## 2025-05-02 RX ADMIN — ONDANSETRON 4 MG: 2 INJECTION, SOLUTION INTRAMUSCULAR; INTRAVENOUS at 21:06

## 2025-05-02 RX ADMIN — PROPOFOL 60 MG: 10 INJECTION, EMULSION INTRAVENOUS at 21:26

## 2025-05-02 RX ADMIN — HYDROMORPHONE HYDROCHLORIDE 1 MG: 1 INJECTION, SOLUTION INTRAMUSCULAR; INTRAVENOUS; SUBCUTANEOUS at 20:03

## 2025-05-02 RX ADMIN — PROPOFOL 30 MG: 10 INJECTION, EMULSION INTRAVENOUS at 21:28

## 2025-05-02 ASSESSMENT — PAIN SCALES - GENERAL
PAINLEVEL_OUTOF10: 3
PAINLEVEL_OUTOF10: 0 - NO PAIN

## 2025-05-02 ASSESSMENT — PAIN DESCRIPTION - LOCATION: LOCATION: KNEE

## 2025-05-02 ASSESSMENT — PAIN - FUNCTIONAL ASSESSMENT: PAIN_FUNCTIONAL_ASSESSMENT: 0-10

## 2025-05-02 NOTE — Clinical Note
Dante Crawford was seen and treated in our emergency department on 5/2/2025.  He may return to work on 05/10/2025.  Patient must be on light duty and wearing knee immobilizer until follow up with orthopaedics.     If you have any questions or concerns, please don't hesitate to call.      Steffen Simerlink, MD

## 2025-05-03 VITALS
BODY MASS INDEX: 21.97 KG/M2 | SYSTOLIC BLOOD PRESSURE: 121 MMHG | DIASTOLIC BLOOD PRESSURE: 66 MMHG | OXYGEN SATURATION: 99 % | WEIGHT: 140 LBS | HEART RATE: 62 BPM | HEIGHT: 67 IN | RESPIRATION RATE: 17 BRPM

## 2025-05-03 LAB
AMPHETAMINES UR QL SCN: ABNORMAL
BARBITURATES UR QL SCN: ABNORMAL
BENZODIAZ UR QL SCN: ABNORMAL
BZE UR QL SCN: ABNORMAL
CANNABINOIDS UR QL SCN: ABNORMAL
FENTANYL+NORFENTANYL UR QL SCN: ABNORMAL
METHADONE UR QL SCN: ABNORMAL
OPIATES UR QL SCN: ABNORMAL
OXYCODONE+OXYMORPHONE UR QL SCN: ABNORMAL
PCP UR QL SCN: ABNORMAL

## 2025-05-03 PROCEDURE — 80307 DRUG TEST PRSMV CHEM ANLYZR: CPT | Performed by: STUDENT IN AN ORGANIZED HEALTH CARE EDUCATION/TRAINING PROGRAM

## 2025-05-03 RX ORDER — IBUPROFEN 600 MG/1
600 TABLET, FILM COATED ORAL EVERY 6 HOURS PRN
Qty: 28 TABLET | Refills: 0 | Status: SHIPPED | OUTPATIENT
Start: 2025-05-03 | End: 2025-05-10

## 2025-05-03 RX ORDER — OXYCODONE HYDROCHLORIDE 5 MG/1
5 TABLET ORAL EVERY 6 HOURS PRN
Qty: 5 TABLET | Refills: 0 | Status: SHIPPED | OUTPATIENT
Start: 2025-05-03 | End: 2025-05-06

## 2025-05-03 RX ORDER — ACETAMINOPHEN 500 MG
1000 TABLET ORAL EVERY 6 HOURS PRN
Qty: 30 TABLET | Refills: 0 | Status: SHIPPED | OUTPATIENT
Start: 2025-05-03 | End: 2025-05-13

## 2025-05-03 NOTE — DISCHARGE INSTRUCTIONS
Do not use tylenol#3 (codeine/acetaminophen), percocet (oxycodone/acetaminophen),vicodin (hydrocodone/acetaminophen), muscle relaxants or any other strong pain medications when driving, caring for children, using tools, working or while doing any activity that would be dangerous if you had been drinking alcohol or were not fully in control of yourself physically and/or mentally. You need to follow some important guidelines if you are taking narcotics that your health care provider prescribed for you. Do not share your narcotic medicine with anyone. If you are seeing more than one health care provider, tell each one that you are taking narcotics for pain. Taking too much can cause an overdose or addiction. When your pain begins to lessen, talk with the health care provider you see for pain about switching to another kind of pain reliever. Store your narcotics safely. Keep them out of reach of children and others in your home.  Tylenol (acetaminophen) Warning: Some medications you have been given today contain Tylenol (acetaminophen).  Do not take more than 4,000mg of tylenol (acetaminophen) in any 24 hour period. Do not take other medications which contain Tylenol (acetaminophen).  Many common over the counter cold and pain medications include Tylenol (acetaminophen) as an ingredient.  Please be very careful, and if you are unsure ask your doctor or pharmacist.  Do not share your medication with anyone.    Extremity Injury Instructions: Return to the ED if you develop increased pain in your injured limb, loss of sensation, or change in color of the limb.

## 2025-05-03 NOTE — ED PROVIDER NOTES
Emergency Department Provider Note        History of Present Illness     Chief Complaint: No chief complaint on file.   History provided by: Patient  Limitations to History: None  External Chart Review: Urgent care office visit 5/1/2025 seen for pain in the bilateral lower extremities.  Has been following with neurology and has had negative EMG and lumbar MRI    HPI:  Dante Crawford is a 24 y.o. male with PMHx of paranoid schizophrenia presenting to the ED for injury at work.  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  Reports that since then he has had bilateral lower extremity pain, but particularly in the left knee.  Denies pain elsewhere.    I personally discussed history with EMS who reports they administered 1 mg of Dilaudid en route.    Physical Exam   Triage vitals:  T    HR    BP    RR    O2        Constitutional: Awake, alert, not in acute distress  HENT: Head atraumatic, mucous membranes moist  Eyes:  Conjunctivae normal  Neck:  Supple  Lungs: Clear to auscultation, breath sounds equal and symmetric, no wheezes, rales, or rhonchi  Heart: Regular rate and rhythm, no murmur, rub or gallop  Abdomen: Soft, nontender, nondistended, no rebound or guarding  Extremities: Obvious deformity to left patella.  There is tenderness to the right thigh and left knee.  ROM of the right lower extremity is intact.  Range of motion of the left knee is limited.  Otherwise range of motion of left lower extremity intact.  DP, PT pulses are 2+ bilaterally. Cap refill brisk. SILT throughout the bilateral lower extremities.  Neuro: Alert, no focal deficit  Skin: Warm, dry, no wounds  Psych: Calm, cooperative       Medical Decision Making & ED Course   Medical Decision Making:  Dante Crawford is a 24 y.o. male who presented to the ED for leg injury at work. Patient was afebrile, hemodynamically stable, and satting on room air on arrival.     Exam as  above.    Clinical course as below in ED course:  ED Course as of 05/03/25 0333   Sat May 03, 2025   0012 XR pelvis 1-2 views  I have personally reviewed and interpreted this XR pelvis, which shows no fx or dislocation. Final decision making pending radiology read.   [SS]   0012 XR knee left 1-2 views  I have personally reviewed and interpreted this XR L knee, which shows patella dislocation. Final decision making pending radiology read.   [SS]   0012 XR tibia fibula left 2 views  I have personally reviewed and interpreted this XR L tib/fib, which shows no fx. Final decision making pending radiology read.   [SS]   0012 XR femur left 2+ views  I have personally reviewed and interpreted this XR L femur, which shows no fx. Final decision making pending radiology read.   [SS]   0012 XR femur right 2+ views  I have personally reviewed and interpreted this XR R femur, which shows no fx. Final decision making pending radiology read.   [SS]   0012 XR knee left 1-2 views  I have personally reviewed and interpreted this XR L knee, which shows patellar dislocation reduced. Final decision making pending radiology read.   [SS]      ED Course User Index  [SS] Steffen Simerlink, MD         Diagnoses as of 05/03/25 0333   Closed dislocation of left patella, initial encounter   Contusion of right thigh, initial encounter       A/P:    X-ray imaging of the right femur negative for fracture.  Suspect that tenderness is secondary to contusion.  X-ray imaging of the left lower extremity shows a left patella dislocation but otherwise negative for any fractures.  He was given additional Dilaudid and required procedural sedation for reduction which he tolerated well.  He was placed in a knee immobilizer and was provided with crutches.  Appropriate for discharge and outpatient orthopedics follow-up.  Discharged in stable condition.  ------------------------------------------------  Independent Test Interpretation: See ED  Course    Disposition   As a result of the work-up, the patient was discharged home.  he was informed of his diagnosis and instructed to come back with any concerns or worsening of condition.  he and was agreeable to the plan as discussed above.  he was given the opportunity to ask questions.  All of the patient's questions were answered.    ED Prescriptions       Medication Sig Dispense Start Date End Date Auth. Provider    acetaminophen (Tylenol) 500 mg tablet Take 2 tablets (1,000 mg) by mouth every 6 hours if needed for mild pain (1 - 3) for up to 10 days. 30 tablet 5/3/2025 5/13/2025 Steffen Simerlink, MD    ibuprofen 600 mg tablet Take 1 tablet (600 mg) by mouth every 6 hours if needed for mild pain (1 - 3) for up to 7 days. 28 tablet 5/3/2025 5/10/2025 Steffen Simerlink, MD    oxyCODONE (Roxicodone) 5 mg immediate release tablet Take 1 tablet (5 mg) by mouth every 6 hours if needed for severe pain (7 - 10) for up to 3 days. 5 tablet 5/3/2025 5/6/2025 Steffen Simerlink, MD            Procedures   Moderate Sedation    Performed by: Steffen Simerlink, MD  Authorized by: Steffen Simerlink, MD    Consent:     Consent obtained:  Written    Consent given by:  Patient    Risks, benefits, and alternatives were discussed: yes      Risks discussed:  Allergic reaction, prolonged hypoxia resulting in organ damage, dysrhythmia, prolonged sedation necessitating reversal, inadequate sedation, respiratory compromise necessitating ventilatory assistance and intubation, nausea and vomiting    Alternatives discussed:  Analgesia without sedation  Universal protocol:     Protocol observed: The universal protocol was observed before the procedure and is documented in the nursing flowsheets    Indications:     Procedure performed:  Dislocation reduction    Procedure necessitating sedation performed by:  Physician performing sedation    Level of sedation:  Moderate  Pre-sedation assessment:     Mouth opening:  3 or more finger  widths    Thyromental distance:  4 finger widths    Mallampati score:  I - soft palate, uvula, fauces, pillars visible    Neck mobility: normal      History of difficult intubation: no    Immediate pre-procedure details:     Monitoring: The patient is on appropriate monitoring (Including: 3 or 5 lead EKG, Pulse Oximetry, Capnography, and Blood Pressure monitoring), oxygenation has been addressed, and critical airway and emergency equipment is immediately available before the initiation of sedation      Reassessment: Patient reassessed immediately prior to procedure      Reviewed: vital signs, relevant labs/tests and NPO status    Procedure details (see MAR for exact dosages):     Total sedation time (minutes):  10  Post-procedure details:     Attendance: Constant attendance by certified staff until patient recovered      Procedure completion:  Tolerated well, no immediate complications  Orthopaedic Injury Treatment - Lower Extremity    Performed by: Steffen Simerlink, MD  Authorized by: Steffen Simerlink, MD    Consent:     Consent obtained:  Written    Consent given by:  Patient    Risks discussed:  Fracture, restricted joint movement, irreducible dislocation and recurrent dislocation    Alternatives discussed:  Delayed treatment  Universal protocol:     Test results available: yes      Imaging studies available: yes      Immediately prior to procedure, a time out was called: yes      Patient identity confirmed:  Arm band  Location:     Location:  Knee    Knee injury location:  L knee    Knee dislocation type: patellar    Pre-procedure details:     Distal perfusion: normal      Range of motion: reduced    Sedation:     Sedation type:  Moderate sedation  Anesthesia:     Anesthesia method:  None  Procedure details:     Manipulation performed: yes      Knee reduction method:  Direct traction    Reduction successful: yes      Reduction confirmed with imaging: yes      Immobilization: knee immobilizer.    Supplies used:   Prefabricated splint (knee immobilizer)  Post-procedure details:     Neurological function: normal      Distal perfusion: normal      Range of motion: improved      Procedure completion:  Tolerated well, no immediate complications      Steffen Simerlink, MD Steffen Simerlink, MD  05/04/25 025

## 2025-05-05 NOTE — PROGRESS NOTES
Subjective      No chief complaint on file.       Surgical History[1]     Medical History[2]     HPI  This 24 year old patient presents today with left knee pain (8/10). The patient states that this left knee pain has been worsening and persistent since an accident at work on 5/2/2025.  The patient states that he was pinned beneath a forklift at work when he suffered an injury to his left knee.  He went to the emergency room where x-rays left knee showed a dislocation of the left patella.  He underwent closed reduction in the ER and was placed in a left knee immobilizer for support.  He was instructed follow-up with orthopedics.  Of note the patient states that he has a history of right and left lower extremity neuropathic pain and weakness.  He had previously been following with orthopedic surgery for lumbar radiculopathy and MRI of the lumbar spine showed no acute findings.  The patient states that he continues to have the weakness and pain at the right and left lower extremities which she describes as a burning shooting pain starting in his low back down his right lower extremity. The patient states that this left knee pain impairs their ability to complete normal activities of daily living including his duties at his job.  He is currently scheduled with neurology but he is not able to get an appointment until September 2025.  The patient has tried Tylenol and ibuprofen with no relief.  He has completed a course of physical therapy with no relief.  He is employed as a  and he works in [employer].  He frequently lifts boxes from 5-60lbs.  He presents today in the presence of his mother for discussion of further treatment options.    RX Allergies[3]     Family History[4]     Social History     Socioeconomic History    Marital status: Single     Spouse name: Not on file    Number of children: Not on file    Years of education: Not on file    Highest education level: Not on file   Occupational  History    Not on file   Tobacco Use    Smoking status: Never     Passive exposure: Never    Smokeless tobacco: Never   Vaping Use    Vaping status: Never Used   Substance and Sexual Activity    Alcohol use: Never    Drug use: Never    Sexual activity: Not on file   Other Topics Concern    Not on file   Social History Narrative    Not on file     Social Drivers of Health     Financial Resource Strain: Not on file   Food Insecurity: Not on file   Transportation Needs: Not on file   Physical Activity: Not on file   Stress: Not on file   Social Connections: Not on file   Intimate Partner Violence: Not on file   Housing Stability: Not on file        ROS  CARDIOLOGY:   Negative for chest pain, shortness of breath.   RESPIRATORY:   Negative for chest pain, shortness of breath.   MUSCULOSKELETAL:   See HPI for details.   NEUROLOGY:   Positive for tingling, numbness, weakness at the right left lower extremity.    Objective    There is no height or weight on file to calculate BMI.     Physical Exam  GENERAL:          General Appearance:  This is a  patient with flat affect, in no acute distress.   DERMATOLOGY:          Skin: skin at the neck, upper and lower back, and trunk is intact. There is no evidence of skin rash, skin breakdown or ulceration, or atrophic skin change.   EXTREMITIES:          Vascular:  Right, left hands and feet are warm with good color and pulses. Right and left calf and thigh are nontender and nonswollen.   NEUROLOGICAL:          Orientation:  Patient is alert and oriented to person, place, time and situation. Right and left upper and lower extremity motor and sensory examinations are intact.  MUSCULOSKELETAL: Neck: No tenderness. No pain or limitation with range of motion. Back: No tenderness. Straight leg test negative bilaterally. Right and left hips: No tenderness over the right or left greater trochanter. Right and left lower extremity in good position. Right knee: Nontender. No pain with gentle  ROM.  Right lower extremity in good position.  Patient has 4-5 strength against resistance at the right lower extremity.  No evidence of right foot drop.  Compartments of the right lower extremity are soft.  Neurovascular does appear to be intact.  Left knee: The knee immobilizer is removed.  There is diffuse tenderness over the knee.  The patella appears midline. There is not an effusion present. The left knee is stable to valgus and varus stressing. Nontender in the left calf. Compartments are soft. Neurovascular is intact to light touch. The patient walks with a painful gait with knee immobilizer applied favoring the left  knee while walking.    Imaging  XR knee left 1-2 views  Result Date: 5/2/2025  Interval reduction of previously described patellar dislocation. No definite evidence of acute displaced knee fracture. There is however evidence of lipohemarthrosis, and as described on the earlier radiograph,  MRI is recommended to exclude subtle osseous injury and to assess for associated tendon, ligamentous or other soft tissue injury.     MACRO: None   Signed by: Jersey Laguerre 5/2/2025 10:32 PM Dictation workstation:   [id number]    XR femur right 2+ views  Result Date: 5/2/2025  No acute osseous abnormality.     MACRO: None.   Signed by: Carlos Moreira 5/2/2025 8:20 PM Dictation workstation:   [id number]    XR femur left 2+ views  Result Date: 5/2/2025  Acute lateral patellar dislocation. Post reduction radiographs as well as follow-up MRI of the left knee is recommended to evaluate for underlying chondral and osteochondral fractures and tears of the medial retinaculum.   No acute fracture of the left tibia/fibula or left femur.   MACRO: None.   Signed by: Carlos Moreira 5/2/2025 8:19 PM Dictation workstation:   [id number]    XR tibia fibula left 2 views  Result Date: 5/2/2025  Acute lateral patellar dislocation. Post reduction radiographs as well as follow-up MRI of the left knee is recommended to  evaluate for underlying chondral and osteochondral fractures and tears of the medial retinaculum.   No acute fracture of the left tibia/fibula or left femur.   MACRO: None.   Signed by: Carlos Moreira 5/2/2025 8:19 PM Dictation workstation:   [id number]    XR knee left 1-2 views  Result Date: 5/2/2025  Acute lateral patellar dislocation. Post reduction radiographs as well as follow-up MRI of the left knee is recommended to evaluate for underlying chondral and osteochondral fractures and tears of the medial retinaculum.   No acute fracture of the left tibia/fibula or left femur.   MACRO: None.   Signed by: Carlos Moreira 5/2/2025 8:19 PM Dictation workstation:   [id number]    XR pelvis 1-2 views  Result Date: 5/2/2025  No acute osseous abnormality of the pelvis or proximal right or left femur.     MACRO: None.   Signed by: Carlos Moreira 5/2/2025 8:17 PM Dictation workstation:   [id number]    MR of the lumbar spine:   IMPRESSION:  Normal MR appearance of the lumbar spine.    EMG of right and left lower extremity;   IMPRESSION:  Impression:     There is no electrophysiologic evidence of lumbosacral radiculopathy, peroneal neuropathy, or peripheral neuropathy of the left lower extremity.  Nearly all muscles of the left lower extremity exhibited poor activation during volitional activity. This pattern of findings can be seen in disorders of the central nervous system, or due to pain.    Cardiology, Vascular, and Other Imaging  No other imaging results found for the past 7 days       Diagnoses and all orders for this visit:  Left knee pain, unspecified chronicity (Primary)  Contusion of right thigh, initial encounter  -     Referral to Neurology; Future  Closed dislocation of left patella, initial encounter  -     Referral to Orthopaedic Surgery  -     MR knee left wo IV contrast; Future  Lumbar strain, initial encounter  -     Referral to Neurology; Future  Lumbar radiculopathy, acute  -     Referral to  Neurology; Future  Right sciatic nerve pain  -     Referral to Neurology; Future  Lumbar radiculopathy  -     Referral to Neurology; Future  Difficulty walking  -     Referral to Neurology; Future     Options are discussed with the patient in detail. The patient is given a referral to neurology Dr. Grider in office today for further evaluation of his right and left lower extremity pain and weakness and to review his EMG study done on 2/10/2025.  An MRI of the left knee is ordered in office today to further evaluate this patient's left patellar dislocation.  I estimate that this patient is able to return to work on June 16, 2025 and is given a note regarding this in office today.  The patient is instructed regarding activity modification and risk for further injury with falling or trauma and to keep the knee immobilizer applied at all times and to weight-bear as tolerated, ice, provider directed at home gentle strengthening and ROM exercises, and the appropriate use of Tylenol as needed for pain with its potential adverse reactions and side effects. The patient understands. Follow up after MRI is complete or sooner as needed. Please note that this report has been produced using speech recognition software.  It may contain errors related to grammar, punctuation or spelling.  Electronically signed, but not reviewed.     Cynthia Frost PA-C           [1]   Past Surgical History:  Procedure Laterality Date    CHEST SURGERY     [2]   Past Medical History:  Diagnosis Date    Allergic     Poisoning by drug 12/30/2024   [3]   Allergies  Allergen Reactions    Sulfa (Sulfonamide Antibiotics) Hives and Rash    Tree Nuts Swelling    Amoxicillin Unknown    Peanut Other   [4]   Family History  Problem Relation Name Age of Onset    No Known Problems Mother      No Known Problems Sister

## 2025-05-06 ENCOUNTER — APPOINTMENT (OUTPATIENT)
Dept: ORTHOPEDIC SURGERY | Facility: CLINIC | Age: 25
End: 2025-05-06
Payer: COMMERCIAL

## 2025-05-06 ENCOUNTER — OFFICE VISIT (OUTPATIENT)
Dept: ORTHOPEDIC SURGERY | Facility: CLINIC | Age: 25
End: 2025-05-06
Payer: COMMERCIAL

## 2025-05-06 DIAGNOSIS — M54.31 RIGHT SCIATIC NERVE PAIN: ICD-10-CM

## 2025-05-06 DIAGNOSIS — S70.11XA CONTUSION OF RIGHT THIGH, INITIAL ENCOUNTER: ICD-10-CM

## 2025-05-06 DIAGNOSIS — S39.012A LUMBAR STRAIN, INITIAL ENCOUNTER: ICD-10-CM

## 2025-05-06 DIAGNOSIS — M25.562 LEFT KNEE PAIN, UNSPECIFIED CHRONICITY: Primary | ICD-10-CM

## 2025-05-06 DIAGNOSIS — M54.16 LUMBAR RADICULOPATHY: ICD-10-CM

## 2025-05-06 DIAGNOSIS — R26.2 DIFFICULTY WALKING: ICD-10-CM

## 2025-05-06 DIAGNOSIS — S83.005A CLOSED DISLOCATION OF LEFT PATELLA, INITIAL ENCOUNTER: ICD-10-CM

## 2025-05-06 DIAGNOSIS — M54.16 LUMBAR RADICULOPATHY, ACUTE: ICD-10-CM

## 2025-05-06 PROCEDURE — 99213 OFFICE O/P EST LOW 20 MIN: CPT | Performed by: PHYSICIAN ASSISTANT

## 2025-05-06 NOTE — PATIENT INSTRUCTIONS
Thank you for coming to see us today!     Continue to rest, ice, and elevate  Tylenol for pain control  We are ordering an MRI in office today.     We will apply to [facility] for your MRI  You can put weight on this as your pain allows  Keep the knee immobilizer applied    Follow up after MRI is done.

## 2025-05-06 NOTE — LETTER
May 6, 2025     Patient: Dante Crawford   YOB: 2000   Date of Visit: 5/6/2025       To Whom It May Concern:    It is my medical opinion that Dante Crawford should remain out of work until June 16th 2025.    If you have any questions or concerns, please don't hesitate to call.         Sincerely,        Cynthia Frost PA-C    CC: No Recipients

## 2025-05-08 ENCOUNTER — APPOINTMENT (OUTPATIENT)
Dept: ORTHOPEDIC SURGERY | Facility: CLINIC | Age: 25
End: 2025-05-08
Payer: COMMERCIAL

## 2025-05-21 ENCOUNTER — HOSPITAL ENCOUNTER (OUTPATIENT)
Dept: RADIOLOGY | Facility: HOSPITAL | Age: 25
Discharge: HOME | End: 2025-05-21
Payer: COMMERCIAL

## 2025-05-21 DIAGNOSIS — S83.005A CLOSED DISLOCATION OF LEFT PATELLA, INITIAL ENCOUNTER: ICD-10-CM

## 2025-05-21 PROCEDURE — 73721 MRI JNT OF LWR EXTRE W/O DYE: CPT | Mod: LT

## 2025-05-27 ENCOUNTER — APPOINTMENT (OUTPATIENT)
Dept: ORTHOPEDIC SURGERY | Facility: CLINIC | Age: 25
End: 2025-05-27
Payer: COMMERCIAL

## 2025-05-27 NOTE — PROGRESS NOTES
Subjective      Chief Complaint   Patient presents with    Left Knee - Follow-up        Surgical History[1]     Medical History[2]     HPI  This 24 year old patient presents today for reevaluation of left knee pain and to review his MRI of the left knee. MRI results were reviewed in detail with the patient at this office visit. The patient states that he injured his left knee at work on 5/2/2025.  The patient states that he was pinned beneath a forklift at work when he suffered an injury to his left knee.  He went to the emergency room where x-rays left knee showed a dislocation of the left patella.  He underwent closed reduction in the ER and was placed in a left knee immobilizer for support.  He was instructed follow-up with orthopedics.  Of note the patient states that he has a history of right and left lower extremity neuropathic pain and weakness.  He had previously been following with orthopedic surgery for lumbar radiculopathy and MRI of the lumbar spine showed no acute findings.  The patient states that he continues to have the weakness and limited mobility in his left knee.  He rates left knee pain at 0/10 currently. The patient states that this left knee pain impairs their ability to complete normal activities of daily living including his duties at his job.   The patient has tried Tylenol and ibuprofen with no relief. He is not currently in physical therapy. He has been off work due to his [facility] injury.  He is employed as a  and he works in [employer].  He frequently lifts boxes from 5-60lbs.       RX Allergies[3]     Family History[4]     Social History     Socioeconomic History    Marital status: Single     Spouse name: Not on file    Number of children: Not on file    Years of education: Not on file    Highest education level: Not on file   Occupational History    Not on file   Tobacco Use    Smoking status: Never     Passive exposure: Never    Smokeless tobacco: Never   Vaping Use     Vaping status: Never Used   Substance and Sexual Activity    Alcohol use: Never    Drug use: Never    Sexual activity: Not on file   Other Topics Concern    Not on file   Social History Narrative    Not on file     Social Drivers of Health     Financial Resource Strain: Not on file   Food Insecurity: Not on file   Transportation Needs: Not on file   Physical Activity: Not on file   Stress: Not on file   Social Connections: Not on file   Intimate Partner Violence: Not on file   Housing Stability: Not on file        ROS  CARDIOLOGY:   Negative for chest pain, shortness of breath.   RESPIRATORY:   Negative for chest pain, shortness of breath.   MUSCULOSKELETAL:   See HPI for details.   NEUROLOGY:   Positive for tingling, numbness, weakness at the right left lower extremity.    Objective    Body mass index is 21.93 kg/m².     Physical Exam  GENERAL:          General Appearance:  This is a  patient with flat affect, in no acute distress.   DERMATOLOGY:          Skin: skin at the neck, upper and lower back, and trunk is intact. There is no evidence of skin rash, skin breakdown or ulceration, or atrophic skin change.   EXTREMITIES:          Vascular:  Right, left hands and feet are warm with good color and pulses. Right and left calf and thigh are nontender and nonswollen.   NEUROLOGICAL:          Orientation:  Patient is alert and oriented to person, place, time and situation. Right and left upper and lower extremity motor and sensory examinations are intact.  MUSCULOSKELETAL: Neck: No tenderness. No pain or limitation with range of motion. Back: No tenderness. Straight leg test negative bilaterally. Right and left hips: No tenderness over the right or left greater trochanter. Right and left lower extremity in good position. Right knee: Nontender. No pain with gentle ROM.  Right lower extremity in good position.  Patient has 4-5 strength against resistance at the right lower extremity.  No evidence of right foot drop.   Compartments of the right lower extremity are soft.  Neurovascular does appear to be intact.  Left knee:  range of motion is limited at 0 to 30 degrees.  The patient is able to actively extend the left knee versus gravity.  The patella appears midline. There is not an effusion present. The left knee is stable to valgus and varus stressing. Nontender in the left calf. Compartments are soft. Neurovascular is intact to light touch. The patient walks with a painful gait with knee immobilizer applied favoring the left  knee while walking.    Imaging  MR knee left wo IV contrast  Result Date: 5/21/2025  1. Sequela of transient lateral patellar dislocation with moderate bone bruise inferior pole of the patella as well as cortical impaction fracture anterior margin of the lateral femoral condyle with subtle depression of the cortical margin measuring 2 mm with superimposed component of trabecular fracture. Findings are seen in conjunction with sprain of the medial patellar retinaculum in particular medial patellofemoral ligament near the femoral attachment. No evidence for rupture. No osteochondral avulsion injury. There is a normal tibial tubercle groove distance and trochlear depth.   2. No meniscal tear     MACRO: None   Signed by: Tip Izaguirre 5/21/2025 4:48 PM Dictation workstation:   [id number]    MR of the lumbar spine:   IMPRESSION:  Normal MR appearance of the lumbar spine.    EMG of right and left lower extremity;   IMPRESSION:  Impression:     There is no electrophysiologic evidence of lumbosacral radiculopathy, peroneal neuropathy, or peripheral neuropathy of the left lower extremity.  Nearly all muscles of the left lower extremity exhibited poor activation during volitional activity. This pattern of findings can be seen in disorders of the central nervous system, or due to pain.   I reviewed all of the x-rays and imaging studies listed above with the patient in the office today.  Cardiology, Vascular, and  Other Imaging  No other imaging results found for the past 7 days       Dante was seen today for follow-up.  Diagnoses and all orders for this visit:  Left knee pain, unspecified chronicity  -     Referral to Physical Therapy; Future  Closed dislocation of left patella, subsequent encounter  -     Referral to Physical Therapy; Future  Contusion of right thigh, subsequent encounter  -     Referral to Physical Therapy; Future       Options are discussed with the patient in detail.  The patient is given a referral to physical therapy for evaluation and treatment of this patient's left knee pain and stiffness which is a result of his industrial injury.  We will apply to the [facility] for approval for this physical therapy.  The patient is further instructed regarding activity modification and risk for further injury with falling or trauma and to keep the knee immobilizer applied at all times and to weight-bear as tolerated, ice, provider directed at home gentle strengthening and ROM exercises, and the appropriate use of Tylenol as needed for pain with its potential adverse reactions and side effects. The patient understands.  This patient has a job that involves prolonged standing, walking and lifting and he is not able to return to his job at this time.  I estimate that he will not be able to return to work for at least 8 weeks.  Return in 6 weeks for reevaluation or sooner as needed. Please note that this report has been produced using speech recognition software.  It may contain errors related to grammar, punctuation or spelling.  Electronically signed, but not reviewed.     Dagoberto Cunningham MD           [1]   Past Surgical History:  Procedure Laterality Date    CHEST SURGERY     [2]   Past Medical History:  Diagnosis Date    Allergic     Poisoning by drug 12/30/2024   [3]   Allergies  Allergen Reactions    Sulfa (Sulfonamide Antibiotics) Hives and Rash    Tree Nuts Swelling    Amoxicillin Unknown    Peanut Other   [4]    Family History  Problem Relation Name Age of Onset    No Known Problems Mother      No Known Problems Sister

## 2025-05-28 ENCOUNTER — OFFICE VISIT (OUTPATIENT)
Dept: ORTHOPEDIC SURGERY | Facility: CLINIC | Age: 25
End: 2025-05-28
Payer: COMMERCIAL

## 2025-05-28 VITALS — BODY MASS INDEX: 21.97 KG/M2 | HEIGHT: 67 IN | WEIGHT: 140 LBS

## 2025-05-28 DIAGNOSIS — M25.562 LEFT KNEE PAIN, UNSPECIFIED CHRONICITY: ICD-10-CM

## 2025-05-28 DIAGNOSIS — S83.005D CLOSED DISLOCATION OF LEFT PATELLA, SUBSEQUENT ENCOUNTER: ICD-10-CM

## 2025-05-28 DIAGNOSIS — S70.11XD CONTUSION OF RIGHT THIGH, SUBSEQUENT ENCOUNTER: ICD-10-CM

## 2025-05-28 PROCEDURE — 99213 OFFICE O/P EST LOW 20 MIN: CPT | Performed by: ORTHOPAEDIC SURGERY

## 2025-05-28 ASSESSMENT — LIFESTYLE VARIABLES
HOW OFTEN DURING THE LAST YEAR HAVE YOU FOUND THAT YOU WERE NOT ABLE TO STOP DRINKING ONCE YOU HAD STARTED: NEVER
HOW MANY STANDARD DRINKS CONTAINING ALCOHOL DO YOU HAVE ON A TYPICAL DAY: PATIENT DOES NOT DRINK
AUDIT TOTAL SCORE: 0
AUDIT-C TOTAL SCORE: 0
HOW OFTEN DURING THE LAST YEAR HAVE YOU HAD A FEELING OF GUILT OR REMORSE AFTER DRINKING: NEVER
HOW OFTEN DURING THE LAST YEAR HAVE YOU FAILED TO DO WHAT WAS NORMALLY EXPECTED FROM YOU BECAUSE OF DRINKING: NEVER
HOW OFTEN DO YOU HAVE SIX OR MORE DRINKS ON ONE OCCASION: NEVER
HOW OFTEN DO YOU HAVE A DRINK CONTAINING ALCOHOL: NEVER
HAVE YOU OR SOMEONE ELSE BEEN INJURED AS A RESULT OF YOUR DRINKING: NO
HAS A RELATIVE, FRIEND, DOCTOR, OR ANOTHER HEALTH PROFESSIONAL EXPRESSED CONCERN ABOUT YOUR DRINKING OR SUGGESTED YOU CUT DOWN: NO
HOW OFTEN DURING THE LAST YEAR HAVE YOU NEEDED AN ALCOHOLIC DRINK FIRST THING IN THE MORNING TO GET YOURSELF GOING AFTER A NIGHT OF HEAVY DRINKING: NEVER
SKIP TO QUESTIONS 9-10: 1
HOW OFTEN DURING THE LAST YEAR HAVE YOU BEEN UNABLE TO REMEMBER WHAT HAPPENED THE NIGHT BEFORE BECAUSE YOU HAD BEEN DRINKING: NEVER

## 2025-05-28 ASSESSMENT — PATIENT HEALTH QUESTIONNAIRE - PHQ9
2. FEELING DOWN, DEPRESSED OR HOPELESS: NOT AT ALL
1. LITTLE INTEREST OR PLEASURE IN DOING THINGS: NOT AT ALL
SUM OF ALL RESPONSES TO PHQ9 QUESTIONS 1 AND 2: 0

## 2025-05-28 ASSESSMENT — ENCOUNTER SYMPTOMS
DEPRESSION: 0
OCCASIONAL FEELINGS OF UNSTEADINESS: 0
LOSS OF SENSATION IN FEET: 0

## 2025-05-28 ASSESSMENT — PAIN SCALES - GENERAL: PAINLEVEL_OUTOF10: 0-NO PAIN

## 2025-05-28 ASSESSMENT — PAIN - FUNCTIONAL ASSESSMENT: PAIN_FUNCTIONAL_ASSESSMENT: NO/DENIES PAIN

## 2025-05-28 NOTE — PATIENT INSTRUCTIONS
Thank you for coming to see us today!     We are going to give you a referral for physical therapy   Please call central scheduling to make this appointment. We will submit a C9 request through worker's compensation for physical therapy.     Please follow up with us in 6 weeks

## 2025-05-28 NOTE — LETTER
May 28, 2025     Patient: Dante Crawford   YOB: 2000   Date of Visit: 5/28/2025       To Whom It May Concern:    It is my medical opinion that Dante Crawford should remain out of work until 7/30/25. The patient will return to our office in 6 weeks for reevaluation of his left knee.     If you have any questions or concerns, please don't hesitate to call.         Sincerely,        Dagoberto Cunningham MD    CC: No Recipients

## 2025-07-09 NOTE — PROGRESS NOTES
Subjective      Chief Complaint   Patient presents with    Left Knee - Follow-up        Surgical History[1]     Medical History[2]     HPI  This 24 year old patient presents today for reevaluation of left knee pain and to review his MRI of the left knee. MRI results were reviewed in detail with the patient at this office visit. The patient states that he injured his left knee at work on 5/2/2025.  The patient states that he was pinned beneath a forklift at work when he suffered an injury to his left knee.  He went to the emergency room where x-rays left knee showed a dislocation of the left patella.  He underwent closed reduction in the ER and was placed in a left knee immobilizer for support.  He was instructed follow-up with orthopedics.  Of note the patient states that he has a history of right and left lower extremity neuropathic pain and weakness.  He had previously been following with orthopedic surgery for lumbar radiculopathy and MRI of the lumbar spine showed no acute findings.  The patient states that he continues to have the weakness and limited mobility in his left knee.  He rates left knee pain at 0/10 currently. The patient states that this left knee weakness impairs their ability to complete normal activities of daily living including his duties at his job.   The patient has tried Tylenol and ibuprofen with no relief. He is not currently in physical therapy. He has been off work due to his [facility] injury.  He is employed as a  and he works in [employer].  He frequently lifts boxes from 5-60lbs.       He presents to the office today for reevaluation of left knee pain  currently a 0/10 and to discuss return to work restrictions as well.     RX Allergies[3]     Family History[4]     Social History     Socioeconomic History    Marital status: Single     Spouse name: Not on file    Number of children: Not on file    Years of education: Not on file    Highest education level: Not on file    Occupational History    Not on file   Tobacco Use    Smoking status: Never     Passive exposure: Never    Smokeless tobacco: Never   Vaping Use    Vaping status: Never Used   Substance and Sexual Activity    Alcohol use: Never    Drug use: Never    Sexual activity: Not on file   Other Topics Concern    Not on file   Social History Narrative    Not on file     Social Drivers of Health     Financial Resource Strain: Not on file   Food Insecurity: Not on file   Transportation Needs: Not on file   Physical Activity: Not on file   Stress: Not on file   Social Connections: Not on file   Intimate Partner Violence: Not on file   Housing Stability: Not on file        ROS  CARDIOLOGY:   Negative for chest pain, shortness of breath.   RESPIRATORY:   Negative for chest pain, shortness of breath.   MUSCULOSKELETAL:   See HPI for details.   NEUROLOGY:   Positive for tingling, numbness, weakness at the right left lower extremity.    Objective    Body mass index is 21.93 kg/m².     Physical Exam  GENERAL:          General Appearance:  This is a  patient with flat affect, in no acute distress.   DERMATOLOGY:          Skin: skin at the neck, upper and lower back, and trunk is intact. There is no evidence of skin rash, skin breakdown or ulceration, or atrophic skin change.   EXTREMITIES:          Vascular:  Right, left hands and feet are warm with good color and pulses. Right and left calf and thigh are nontender and nonswollen.   NEUROLOGICAL:          Orientation:  Patient is alert and oriented to person, place, time and situation. Right and left upper and lower extremity motor and sensory examinations are intact.  MUSCULOSKELETAL: Neck: No tenderness. No pain or limitation with range of motion. Back: No tenderness. Straight leg test negative bilaterally. Right and left hips: No tenderness over the right or left greater trochanter. Right and left lower extremity in good position. Right knee: Nontender. No pain with gentle ROM.   Right lower extremity in good position.  Patient has 4-5 strength against resistance at the right lower extremity.  No evidence of right foot drop.  Compartments of the right lower extremity are soft.  Neurovascular does appear to be intact.  Left knee:  range of motion is limited at 0 to 30 degrees.  The patient is able to actively extend the left knee versus gravity.  The patella appears midline. There is not an effusion present. The left knee is stable to valgus and varus stressing. Nontender in the left calf. Compartments are soft. Neurovascular is intact to light touch. The patient walks with a painful gait with knee immobilizer applied favoring the left  knee while walking.    Imaging  MR of the left knee listed below is reviewed with the patient in the office today and shows:   IMPRESSION:  1. Sequela of transient lateral patellar dislocation with moderate  bone bruise inferior pole of the patella as well as cortical  impaction fracture anterior margin of the lateral femoral condyle  with subtle depression of the cortical margin measuring 2 mm with  superimposed component of trabecular fracture. Findings are seen in  conjunction with sprain of the medial patellar retinaculum in  particular medial patellofemoral ligament near the femoral  attachment. No evidence for rupture. No osteochondral avulsion  injury. There is a normal tibial tubercle groove distance and  trochlear depth.      2. No meniscal tear    MR of the lumbar spine:   IMPRESSION:  Normal MR appearance of the lumbar spine.    EMG of right and left lower extremity;   IMPRESSION:  Impression:     There is no electrophysiologic evidence of lumbosacral radiculopathy, peroneal neuropathy, or peripheral neuropathy of the left lower extremity.  Nearly all muscles of the left lower extremity exhibited poor activation during volitional activity. This pattern of findings can be seen in disorders of the central nervous system, or due to pain.   I reviewed  all of the x-rays and imaging studies listed above with the patient in the office today.    Cardiology, Vascular, and Other Imaging  No other imaging results found for the past 7 days       Dante was seen today for follow-up.  Diagnoses and all orders for this visit:  Left knee pain, unspecified chronicity  Closed dislocation of left patella, subsequent encounter  Contusion of right thigh, subsequent encounter       Options are discussed with the patient in detail.  The patient  had previously been given a referral to physical therapy for evaluation and treatment of this patient's left knee pain and stiffness which is a result of his industrial injury.  He states that he has received approval from [facility], [place] and is scheduled to start physical therapy soon.  The patient is further instructed regarding activity modification and risk for further injury with falling or trauma and to keep  A flexible knee brace with patellar support applied at all times (the patient previously had a knee immobilizer but states he cannot possibly return to work with his knee immobilizer on and wishes to return to work) and to weight-bear as tolerated, ice, provider directed at home gentle strengthening and ROM exercises, and the appropriate use of Tylenol as needed for pain with its potential adverse reactions and side effects. The patient understands.  He requests and is given a note for return to his previous employment, regular duty, on 7-. Return in 12 weeks for reevaluation or sooner as needed. Please note that this report has been produced using speech recognition software.  It may contain errors related to grammar, punctuation or spelling.  Electronically signed, but not reviewed.     Dagoberto Cunningham MD           [1]   Past Surgical History:  Procedure Laterality Date    CHEST SURGERY     [2]   Past Medical History:  Diagnosis Date    Allergic     Poisoning by drug 12/30/2024   [3]   Allergies  Allergen Reactions    Sulfa  (Sulfonamide Antibiotics) Hives and Rash    Tree Nuts Swelling    Amoxicillin Unknown    Peanut Other   [4]   Family History  Problem Relation Name Age of Onset    No Known Problems Mother      No Known Problems Sister

## 2025-07-11 ENCOUNTER — OFFICE VISIT (OUTPATIENT)
Dept: ORTHOPEDIC SURGERY | Facility: CLINIC | Age: 25
End: 2025-07-11
Payer: COMMERCIAL

## 2025-07-11 VITALS — WEIGHT: 140 LBS | BODY MASS INDEX: 21.97 KG/M2 | HEIGHT: 67 IN

## 2025-07-11 DIAGNOSIS — M25.562 LEFT KNEE PAIN, UNSPECIFIED CHRONICITY: ICD-10-CM

## 2025-07-11 DIAGNOSIS — S83.005D CLOSED DISLOCATION OF LEFT PATELLA, SUBSEQUENT ENCOUNTER: ICD-10-CM

## 2025-07-11 DIAGNOSIS — S70.11XD CONTUSION OF RIGHT THIGH, SUBSEQUENT ENCOUNTER: ICD-10-CM

## 2025-07-11 PROCEDURE — 99213 OFFICE O/P EST LOW 20 MIN: CPT | Performed by: ORTHOPAEDIC SURGERY

## 2025-07-11 ASSESSMENT — COLUMBIA-SUICIDE SEVERITY RATING SCALE - C-SSRS
6. HAVE YOU EVER DONE ANYTHING, STARTED TO DO ANYTHING, OR PREPARED TO DO ANYTHING TO END YOUR LIFE?: NO
2. HAVE YOU ACTUALLY HAD ANY THOUGHTS OF KILLING YOURSELF?: NO

## 2025-07-11 ASSESSMENT — PAIN - FUNCTIONAL ASSESSMENT: PAIN_FUNCTIONAL_ASSESSMENT: NO/DENIES PAIN

## 2025-07-11 ASSESSMENT — PATIENT HEALTH QUESTIONNAIRE - PHQ9
1. LITTLE INTEREST OR PLEASURE IN DOING THINGS: NOT AT ALL
SUM OF ALL RESPONSES TO PHQ9 QUESTIONS 1 AND 2: 0
2. FEELING DOWN, DEPRESSED OR HOPELESS: NOT AT ALL

## 2025-07-11 ASSESSMENT — PAIN SCALES - GENERAL: PAINLEVEL_OUTOF10: 0-NO PAIN

## 2025-07-11 ASSESSMENT — ENCOUNTER SYMPTOMS
OCCASIONAL FEELINGS OF UNSTEADINESS: 0
LOSS OF SENSATION IN FEET: 0
DEPRESSION: 0

## 2025-07-11 NOTE — LETTER
July 11, 2025     Patient: Dante Crawford   YOB: 2000   Date of Visit: 7/11/2025       To Whom It May Concern:    It is my medical opinion that Dante Crawford may return to work on Sunday July 13th 2025 full Duty .    If you have any questions or concerns, please don't hesitate to call.         Sincerely,        Dagoberto Cunningham MD    CC: No Recipients

## 2025-07-14 ENCOUNTER — APPOINTMENT (OUTPATIENT)
Dept: PHYSICAL THERAPY | Facility: CLINIC | Age: 25
End: 2025-07-14
Payer: COMMERCIAL

## 2025-09-08 ENCOUNTER — APPOINTMENT (OUTPATIENT)
Dept: PRIMARY CARE | Facility: CLINIC | Age: 25
End: 2025-09-08
Payer: COMMERCIAL

## 2025-09-23 ENCOUNTER — APPOINTMENT (OUTPATIENT)
Dept: NEUROLOGY | Facility: CLINIC | Age: 25
End: 2025-09-23
Payer: COMMERCIAL